# Patient Record
Sex: FEMALE | Race: WHITE | Employment: OTHER | URBAN - METROPOLITAN AREA
[De-identification: names, ages, dates, MRNs, and addresses within clinical notes are randomized per-mention and may not be internally consistent; named-entity substitution may affect disease eponyms.]

---

## 2023-11-20 ENCOUNTER — OFFICE VISIT (OUTPATIENT)
Dept: FAMILY MEDICINE CLINIC | Facility: CLINIC | Age: 63
End: 2023-11-20
Payer: MEDICARE

## 2023-11-20 VITALS
TEMPERATURE: 98 F | WEIGHT: 194 LBS | HEART RATE: 74 BPM | SYSTOLIC BLOOD PRESSURE: 150 MMHG | RESPIRATION RATE: 16 BRPM | BODY MASS INDEX: 33.12 KG/M2 | OXYGEN SATURATION: 94 % | DIASTOLIC BLOOD PRESSURE: 88 MMHG | HEIGHT: 64 IN

## 2023-11-20 DIAGNOSIS — Z76.89 ENCOUNTER TO ESTABLISH CARE: Primary | ICD-10-CM

## 2023-11-20 DIAGNOSIS — K21.00 GASTROESOPHAGEAL REFLUX DISEASE WITH ESOPHAGITIS WITHOUT HEMORRHAGE: ICD-10-CM

## 2023-11-20 DIAGNOSIS — E04.1 THYROID NODULE: ICD-10-CM

## 2023-11-20 DIAGNOSIS — R03.0 ELEVATED BP WITHOUT DIAGNOSIS OF HYPERTENSION: ICD-10-CM

## 2023-11-20 DIAGNOSIS — E03.9 HYPOTHYROIDISM, UNSPECIFIED TYPE: ICD-10-CM

## 2023-11-20 RX ORDER — LEVOTHYROXINE SODIUM 0.15 MG/1
150 TABLET ORAL
Qty: 90 TABLET | Refills: 1 | Status: SHIPPED | OUTPATIENT
Start: 2023-11-20

## 2023-11-20 RX ORDER — ESOMEPRAZOLE MAGNESIUM 40 MG/1
40 CAPSULE, DELAYED RELEASE ORAL
Qty: 90 CAPSULE | Refills: 0 | Status: SHIPPED | OUTPATIENT
Start: 2023-11-20

## 2023-11-20 RX ORDER — LEVOTHYROXINE SODIUM 0.15 MG/1
150 TABLET ORAL
COMMUNITY
End: 2023-11-20

## 2023-12-04 ENCOUNTER — OFFICE VISIT (OUTPATIENT)
Dept: FAMILY MEDICINE CLINIC | Facility: CLINIC | Age: 63
End: 2023-12-04
Payer: MEDICARE

## 2023-12-04 VITALS
HEIGHT: 64 IN | DIASTOLIC BLOOD PRESSURE: 82 MMHG | OXYGEN SATURATION: 96 % | WEIGHT: 195 LBS | TEMPERATURE: 98 F | BODY MASS INDEX: 33.29 KG/M2 | RESPIRATION RATE: 16 BRPM | SYSTOLIC BLOOD PRESSURE: 170 MMHG | HEART RATE: 78 BPM

## 2023-12-04 DIAGNOSIS — I10 PRIMARY HYPERTENSION: Primary | ICD-10-CM

## 2023-12-04 PROCEDURE — 99213 OFFICE O/P EST LOW 20 MIN: CPT | Performed by: NURSE PRACTITIONER

## 2023-12-04 RX ORDER — HYDROCHLOROTHIAZIDE 12.5 MG/1
12.5 TABLET ORAL DAILY
Qty: 90 TABLET | Refills: 1 | Status: SHIPPED | OUTPATIENT
Start: 2023-12-04 | End: 2024-11-28

## 2023-12-08 ENCOUNTER — TELEPHONE (OUTPATIENT)
Dept: FAMILY MEDICINE CLINIC | Facility: CLINIC | Age: 63
End: 2023-12-08

## 2023-12-08 DIAGNOSIS — J01.90 SUBACUTE SINUSITIS, UNSPECIFIED LOCATION: Primary | ICD-10-CM

## 2023-12-08 RX ORDER — AMOXICILLIN AND CLAVULANATE POTASSIUM 875; 125 MG/1; MG/1
1 TABLET, FILM COATED ORAL 2 TIMES DAILY
Qty: 20 TABLET | Refills: 0 | Status: SHIPPED | OUTPATIENT
Start: 2023-12-08 | End: 2023-12-18

## 2023-12-08 NOTE — TELEPHONE ENCOUNTER
Called the pt and advised of the medication and follow up recommendations    She v/u    Offered to schedule appt for next week and the pt states that she will schedule on line

## 2023-12-08 NOTE — TELEPHONE ENCOUNTER
Spoke with patient who states she is not having wheezing or shortness of breath. States she feels a little winded if up moving around. States she can feel mucus/noises in her throat. States once she coughs up mucus throat clears up. States she has sjogrens disease so her sinus drainage is think and sinuses feel very dry.     Routed to [facility] to advise if rx to be sent

## 2023-12-08 NOTE — TELEPHONE ENCOUNTER
Patient was seen earlier in the week    She was starting to get \"cold\" symptoms    She was told to call if symptoms worsened    Patient states sinus pressure and pain are very bad now    Feeling much worse    Requests rx    Please adv  Thank you

## 2023-12-10 ENCOUNTER — HOSPITAL ENCOUNTER (OUTPATIENT)
Dept: CT IMAGING | Age: 63
Discharge: HOME | End: 2023-12-10
Attending: NURSE PRACTITIONER

## 2023-12-10 DIAGNOSIS — Z13.6 SCREENING FOR HEART DISEASE: ICD-10-CM

## 2023-12-12 ENCOUNTER — TELEPHONE (OUTPATIENT)
Dept: FAMILY MEDICINE CLINIC | Facility: CLINIC | Age: 63
End: 2023-12-12

## 2023-12-12 PROBLEM — I10 PRIMARY HYPERTENSION: Status: ACTIVE | Noted: 2023-12-12

## 2023-12-12 NOTE — TELEPHONE ENCOUNTER
----- Message from ADELE Cody sent at 12/12/2023 11:13 AM CST -----  Calcium score is zero, which is a healthy finding. It might not be a bad idea to start a daily fish oil supplement to preserve heart health. As far as the lung findings, suggests chronic bronchitis, which may be related to sjogrens. Speaking of, I would like her to stop hydrochlorothiazide and start lisinopril 5mg instead. If blood pressures remain above 140/90 increase dose to 10mg daily.

## 2023-12-12 NOTE — TELEPHONE ENCOUNTER
Patient notified and verbalized understanding. Has tried fish oil in the past but it causes stomach pain. Is agreeable to stopping hydrochlorothiazide and trying lisinopril  Aware script was sent to [address] [place] [address]. States readings typically 135/80-83  Patient advised to record readings for the next week or so and call to update [facility]. Patient state she has f/u scheduled.  Will bring readings to appt  Future Appointments   Date Time Provider Tito Chung   12/13/2023  1:30 PM PF [facility]3 PF [facility]   12/18/2023 10:00 AM Sheila Petyt [facility] Nabeel Al   [facility]24 [facility]

## 2023-12-13 ENCOUNTER — HOSPITAL ENCOUNTER (OUTPATIENT)
Dept: ULTRASOUND IMAGING | Age: 63
Discharge: HOME | End: 2023-12-13
Attending: NURSE PRACTITIONER
Payer: MEDICARE

## 2023-12-13 DIAGNOSIS — E04.1 THYROID NODULE: ICD-10-CM

## 2023-12-13 DIAGNOSIS — E03.9 HYPOTHYROIDISM, UNSPECIFIED TYPE: ICD-10-CM

## 2023-12-13 PROCEDURE — 76536 US EXAM OF HEAD AND NECK: CPT | Performed by: NURSE PRACTITIONER

## 2023-12-14 ENCOUNTER — TELEPHONE (OUTPATIENT)
Dept: FAMILY MEDICINE CLINIC | Facility: CLINIC | Age: 63
End: 2023-12-14

## 2023-12-14 NOTE — TELEPHONE ENCOUNTER
----- Message from ADELE Copeland sent at 12/14/2023  9:47 AM CST -----  Stable findings, no nodules - continue levothyroxine as prescribed

## 2023-12-18 ENCOUNTER — TELEMEDICINE (OUTPATIENT)
Dept: FAMILY MEDICINE CLINIC | Facility: CLINIC | Age: 63
End: 2023-12-18
Payer: MEDICARE

## 2023-12-18 DIAGNOSIS — I10 PRIMARY HYPERTENSION: Primary | ICD-10-CM

## 2023-12-18 PROCEDURE — 99212 OFFICE O/P EST SF 10 MIN: CPT | Performed by: NURSE PRACTITIONER

## 2023-12-18 NOTE — PROGRESS NOTES
Unsuccessful video visit 09:56 and 09:58am  Sent direct link via text without response 09:58  Video connected without audio, completed visit via phone call    CHIEF COMPLAINT:  Sinus infection and blood pressure    HISTORY OF PRESENT ILLNESS:    This is a 70year old female who verbally consents to a phone visit today, 12/18/23 for follow-up on sinus infection and blood pressure. Stacy Villarreal believes she is recovering. Took last dose of amox/clav this morning. Reports improvement over the past two days. Has not used flonase in the past two days. Reports some vaginal irritation and possible yeast, declines rx at this time. Denies fevers or body aches. Blood pressures at home have been 150/88, 142/81, and 151/84 with use of lisinopril 5mg. We will increase to 10mg daily. Stacy agrees to continue to monitor blood pressures at home and to send [employer] message with blood pressure readings in about a week. Positive for dry mouth, which is not a new finding for her. ALLERGIES:  Allergies   Allergen Reactions    Ciprofloxacin MYALGIA     Rhabdomyolitis    Sulfa Antibiotics RASH     Red rash all over body    Diflucan [Fluconazole] RASH       CURRENT MEDICATIONS:  Current Outpatient Medications   Medication Sig Dispense Refill    lisinopril 5 MG Oral Tab Take 1 tablet (5 mg total) by mouth daily. 90 tablet 0    amoxicillin clavulanate 875-125 MG Oral Tab Take 1 tablet by mouth 2 (two) times daily for 10 days. 20 tablet 0    Esomeprazole Magnesium 40 MG Oral Capsule Delayed Release Take 1 capsule (40 mg total) by mouth every morning before breakfast. 90 capsule 0    levothyroxine 150 MCG Oral Tab Take 1 tablet (150 mcg total) by mouth before breakfast. 90 tablet 1    hydroxychloroquine 200 MG Oral Tab Take 1 tablet (200 mg total) by mouth 2 (two) times daily. 180 tablet 1    cyclobenzaprine 5 MG Oral Tab Take 1 tablet (5 mg total) by mouth 3 (three) times daily as needed for Muscle spasms.  90 tablet 0 Vitamin D, Cholecalciferol, 25 MCG (1000 UT) Oral Tab Take by mouth. MEDICAL HISTORY:  Past Medical History:   Diagnosis Date    GERD with esophagitis     Hx of colonic polyps     Tubular adenoma    Hypothyroidism     Lupus ([address]     Sjogren's disease ([facility] [address]      Past Surgical History:   Procedure Laterality Date    CARPAL TUNNEL RELEASE Right 2018    CARPAL TUNNEL RELEASE Left     COLONOSCOPY  2017    No polyps    CORRECT BUNION,SIMPLE Left     CORRECT CLAW FINGER      EGD  2017    Normal     Family History   Problem Relation Age of Onset    Other (lymphoma) Mother     Hypertension Mother     Cancer Father         kidney    Cancer Sister         leukemia    Other (Other) Sister         Lupus    Cancer Sister     Other (ALS) Sister     Cancer Brother         leukemia    Other (Chronic lymphocytic leukemia) Brother     Cancer Brother         Chronic lymphocytic leukemia    Other (Other) Brother      Family Status   Relation Status    Mo (Not Specified)    Fa (Not Specified)    Sis     Sis Alive    Sis Alive    [address]     [address]      Social History     Socioeconomic History    Marital status:    Tobacco Use    Smoking status: Never    Smokeless tobacco: Never       ROS:    GENERAL:  Reports feeling better  RESPIRATORY:  Denies difficulty breathing  CARDIO:  Denies chest pain with exertion  NEURO:  Denies recent episodes of syncope    VITALS:  No vitals were obtained by clinical staff during this visit. PHYSICAL EXAM:    Physical exam is limited due to video without audio then phone visit. Jorgito Mccloud serves as a reliable historian. Speech is clear and organized without difficulty speaking in full sentences. No shortness of breath or cough noted during our conversation. Overall is feeling better than 10 days ago. ASSESSMENT & PLAN:    1.  Primary hypertension  Discontinued hydrochlorothiazide   + sjogren's syndrome  Began lisinopril 5mg daily  Increased to lisinopril 10mg daily

## 2023-12-26 ENCOUNTER — PATIENT MESSAGE (OUTPATIENT)
Dept: FAMILY MEDICINE CLINIC | Facility: CLINIC | Age: 63
End: 2023-12-26

## 2023-12-26 NOTE — TELEPHONE ENCOUNTER
From: [facility]  To: Juancho Mitchell  Sent: 12/26/2023 12:07 PM CST  Subject: Blood pressure readings    I started taking HCTZ on 12/8 but took only for one one week. Readings were in 130-156 / 83-91. Started 5 mg Lisinopril on 12/14. Readings were 138/82, 137/86, 150/88, 142/81, 154/84, 152/89    Started 10 mg Lisinopril on 12/18. Readings were 145/81, 143/88, 148/86, 147/83, 154/85, 133/82, 148/78, 149/83, 132/76, 149/80, 155/85, 143/85, 157/92 (today)  I check the readings about 7:30 am before coffee or breakfast, and again around 5 to 7 pm. The mornings seem just as high as the late afternoon. Feeling fine otherwise. Of course, Blas parties. But coffee intake down and no weight gain. Not great results.

## 2023-12-28 NOTE — TELEPHONE ENCOUNTER
Pt asking for a follow up to her bp readings--not sure low dose bp med is helping    Pt out of medication now and would like NP to send new script to [facility] Hinkle in [facility]. Please advise, thank you!

## 2023-12-29 DIAGNOSIS — I10 PRIMARY HYPERTENSION: Primary | ICD-10-CM

## 2023-12-29 RX ORDER — LISINOPRIL 10 MG/1
10 TABLET ORAL 2 TIMES DAILY
Qty: 60 TABLET | Refills: 0 | Status: SHIPPED | OUTPATIENT
Start: 2023-12-29 | End: 2023-12-30

## 2023-12-29 NOTE — TELEPHONE ENCOUNTER
Pt failed refill protocol for the following reasons:  Hypertension Medications Protocol Cemuzt2812/29/2023 01:30 PM   Protocol Details CMP or BMP in past 12 months    Last serum creatinine< 2.0    Appointment in past 6 or next 3 months         Last refill: 12/29/23  Last appt: 12/4/23  Next appt: Future Appointments   Date Time Provider Tito Chung   3/4/2024 10:00 AM ADELE Escobedo [facility] TASHA Luque [place] to Nurse Practitioner Carlos Daigle, please advise on refills. Thank you.

## 2023-12-29 NOTE — TELEPHONE ENCOUNTER
Per message she increased to Lisinopril 10 mg on 12/18/2023.  Should she continue to take or increase to 20 mg?

## 2023-12-29 NOTE — TELEPHONE ENCOUNTER
May take about 2 weeks to see full effect. We can increase to 10mg if she would like then we can always cut back if needed.     I will send 10mg of lisinopril if she hasn't already picked up the 90 day supply of 5mg that I sent two weeks ago    Please confirm if patient is comfortable with increasing to 10mg   Please confirm if she picked up the 90 day supply of 5mg that I sent two weeks ago (if she hasn't I will send #90 of 10mg instead)

## 2023-12-29 NOTE — TELEPHONE ENCOUNTER
If blood pressures are greater than 140/90 after January 1st, 2024, begin taking 20mg daily    Would she like me to send 30 day supply of 10mg then once we get a stable dose we will start sending 90 day supply?

## 2023-12-29 NOTE — TELEPHONE ENCOUNTER
Spoke to patient, she is out of 5mg tabs as she only picked up a 30 day supply. Offered to do BID dosing of Lisinopril 10mg so if after the 1st of January, BP readings are still high, she will have some to increase to 20mg.

## 2023-12-30 RX ORDER — LISINOPRIL 10 MG/1
20 TABLET ORAL DAILY
Qty: 180 TABLET | Refills: 0 | Status: SHIPPED | OUTPATIENT
Start: 2023-12-30

## 2024-01-08 ENCOUNTER — PATIENT MESSAGE (OUTPATIENT)
Dept: FAMILY MEDICINE CLINIC | Facility: CLINIC | Age: 64
End: 2024-01-08

## 2024-01-08 NOTE — TELEPHONE ENCOUNTER
From: Tiffany Hensley  To: Germaine Shabazz  Sent: 1/8/2024 1:13 PM CST  Subject: New b/p readings on 20 mg lisinopril    On 1/1 I started taking 20 mg lisinopril each night. The readings are not moving consistently down. And the morning readings seem higher than evening.  AM READINGS PM READINGS  149/86 Bryan.2 140/80  151/85 Bryan. 4  138/89  158/88 Bryan. 5 156/82  154/94 Bryan. 6 didn't check  137/87 Bryan. 7 144/84  148/86 Bryan. 8 143/78 (1:00 pm)  What's next?

## 2024-01-22 ENCOUNTER — OFFICE VISIT (OUTPATIENT)
Dept: FAMILY MEDICINE CLINIC | Facility: CLINIC | Age: 64
End: 2024-01-22
Payer: MEDICARE

## 2024-01-22 VITALS
SYSTOLIC BLOOD PRESSURE: 134 MMHG | OXYGEN SATURATION: 99 % | HEIGHT: 64 IN | BODY MASS INDEX: 32.98 KG/M2 | TEMPERATURE: 97 F | DIASTOLIC BLOOD PRESSURE: 74 MMHG | WEIGHT: 193.19 LBS | HEART RATE: 68 BPM

## 2024-01-22 DIAGNOSIS — I10 PRIMARY HYPERTENSION: ICD-10-CM

## 2024-01-22 PROCEDURE — 99213 OFFICE O/P EST LOW 20 MIN: CPT | Performed by: NURSE PRACTITIONER

## 2024-01-22 RX ORDER — LOSARTAN POTASSIUM 25 MG/1
25 TABLET ORAL DAILY
Qty: 90 TABLET | Refills: 0 | Status: SHIPPED | OUTPATIENT
Start: 2024-01-22

## 2024-01-22 RX ORDER — DOXYCYCLINE HYCLATE 100 MG/1
100 CAPSULE ORAL 2 TIMES DAILY
COMMUNITY
Start: 2024-01-15

## 2024-01-22 RX ORDER — SODIUM PICOSULFATE, MAGNESIUM OXIDE, AND ANHYDROUS CITRIC ACID 12; 3.5; 1 G/175ML; G/175ML; MG/175ML
LIQUID ORAL
COMMUNITY
Start: 2024-01-18

## 2024-02-13 ENCOUNTER — OFFICE VISIT (OUTPATIENT)
Dept: FAMILY MEDICINE CLINIC | Facility: CLINIC | Age: 64
End: 2024-02-13
Payer: MEDICARE

## 2024-02-13 VITALS
SYSTOLIC BLOOD PRESSURE: 162 MMHG | BODY MASS INDEX: 33.12 KG/M2 | RESPIRATION RATE: 16 BRPM | TEMPERATURE: 98 F | OXYGEN SATURATION: 99 % | WEIGHT: 194 LBS | HEART RATE: 68 BPM | DIASTOLIC BLOOD PRESSURE: 82 MMHG | HEIGHT: 64 IN

## 2024-02-13 DIAGNOSIS — I10 UNCONTROLLED HYPERTENSION: Primary | ICD-10-CM

## 2024-02-13 PROCEDURE — 99213 OFFICE O/P EST LOW 20 MIN: CPT | Performed by: NURSE PRACTITIONER

## 2024-02-13 NOTE — PROGRESS NOTES
CHIEF COMPLAINT:    Chief Complaint   Patient presents with    Follow - Up     Blood pressure medication follow up       HISTORY OF PRESENT ILLNESS:    Tiffany presents today, February 13, 2024, for blood pressure check and to compare blood pressure cuff readings.  Blood pressures with use of losartan 25mg are higher than readings with previous use of lisinopril 30mg.  Uncertain of daily sodium intake, discussed low sodium diet, less than 1500mg daily.    We will increase losartan from 25mg to 50mg and continue blood pressure checks.  Plan to reassess after about 2-4 weeks.  Tiffany to call clinic if blood pressure increases with increase of losartan.  Considering amlodipine.    Denies headaches, near fainting, chest pain, swelling of ankles, or diarrhea.  Denies new symptoms since last visit.    BP Readings from Last 5 Encounters:   02/13/24 (!) 153/91   01/22/24 134/74   12/04/23 (!) 170/82   11/20/23 150/88   01/18/22 120/77     ALLERGIES:  Allergies   Allergen Reactions    Ciprofloxacin MYALGIA     Rhabdomyolitis    Sulfa Antibiotics RASH     Red rash all over body    Diflucan [Fluconazole] RASH       CURRENT MEDICATIONS:  Current Outpatient Medications   Medication Sig Dispense Refill    losartan 25 MG Oral Tab Take 1 tablet (25 mg total) by mouth daily. 90 tablet 0    Esomeprazole Magnesium 40 MG Oral Capsule Delayed Release Take 1 capsule (40 mg total) by mouth every morning before breakfast. 90 capsule 0    levothyroxine 150 MCG Oral Tab Take 1 tablet (150 mcg total) by mouth before breakfast. 90 tablet 1    hydroxychloroquine 200 MG Oral Tab Take 1 tablet (200 mg total) by mouth 2 (two) times daily. 180 tablet 1    cyclobenzaprine 5 MG Oral Tab Take 1 tablet (5 mg total) by mouth 3 (three) times daily as needed for Muscle spasms. 90 tablet 0    Vitamin D, Cholecalciferol, 25 MCG (1000 UT) Oral Tab Take by mouth.         MEDICAL HISTORY:  Past Medical History:   Diagnosis Date    GERD with esophagitis      Hx of colonic polyps     Tubular adenoma    Hypothyroidism     Lupus (HCC)     Sjogren's disease (HCC)      Past Surgical History:   Procedure Laterality Date    CARPAL TUNNEL RELEASE Right 2018    CARPAL TUNNEL RELEASE Left     COLONOSCOPY  2017    No polyps    CORRECT BUNION,SIMPLE Left     CORRECT CLAW FINGER      EGD  2017    Normal     Family History   Problem Relation Age of Onset    Other (lymphoma) Mother     Hypertension Mother     Cancer Father         kidney    Cancer Sister         leukemia    Other (Other) Sister         Lupus    Cancer Sister     Other (ALS) Sister     Cancer Brother         leukemia    Other (Chronic lymphocytic leukemia) Brother     Cancer Brother         Chronic lymphocytic leukemia    Other (Other) Brother      Family Status   Relation Status    Mo (Not Specified)    Fa (Not Specified)    Sis     Sis Alive    Sis Alive    Bro     Bro      Social History     Socioeconomic History    Marital status:    Tobacco Use    Smoking status: Never    Smokeless tobacco: Never   Vaping Use    Vaping Use: Never used   Substance and Sexual Activity    Alcohol use: Not Currently    Drug use: Never       ROS:  GENERAL:  Denies recorded temperatures greater than 100.5F  RESPIRATORY:  Denies difficulty breathing  CARDIAC:  Denies swelling of ankles or chest pain    VITALS:   BP (!) 153/91   Pulse 68   Temp 98 °F (36.7 °C) (Temporal)   Resp 16   Ht 5' 4\" (1.626 m)   Wt 194 lb (88 kg)   SpO2 99%   BMI 33.30 kg/m²    Reviewed by Germaine Shabazz MS, APRN, FNP-BC    PHYSICAL EXAM:    Constitutional:       Appears well.  Sitting upright on exam table.  Well developed, well nourished, and in no acute distress  HEENT:      Facial features symmetric. Normocephalic and atraumatic     Sclera anicteric.  EOMs intact without nystagmus.  Pupils round and equal.  Cardiovascular:      Heart sounds: Regular rate and rhythm without murmur  Pulmonary:      Chest  expansion symmetric.  Breathing nonlabored. Lungs clear throughout  Musculoskeletal:         Movements smooth and controlled with appropriate coordination.       Gait intact, steady, nonantalgic.  Skin:     Warm and dry without discoloration.  Psychiatric:         Alert and oriented.  Calm and cooperative.  Speech is clear.     ASSESSMENT & PLAN:    Hesitancy with increasing lisinopril dose, replaced with losartan 25mg,  Increased losartan to 50mg today  Recheck in 2-4 weeks    If no improvement, will switch to amlodipine 5mg and/or follow-up with cardiology    1. Uncontrolled hypertension  Reinforced low sodium diet  - Cardio Referral - Internal

## 2024-02-19 ENCOUNTER — OFFICE VISIT (OUTPATIENT)
Dept: FAMILY MEDICINE CLINIC | Facility: CLINIC | Age: 64
End: 2024-02-19
Payer: MEDICARE

## 2024-02-19 VITALS
TEMPERATURE: 98 F | SYSTOLIC BLOOD PRESSURE: 128 MMHG | DIASTOLIC BLOOD PRESSURE: 72 MMHG | RESPIRATION RATE: 16 BRPM | HEART RATE: 82 BPM | HEIGHT: 64 IN | WEIGHT: 194 LBS | OXYGEN SATURATION: 95 % | BODY MASS INDEX: 33.12 KG/M2

## 2024-02-19 DIAGNOSIS — R82.998 URINE WBC INCREASED: ICD-10-CM

## 2024-02-19 DIAGNOSIS — R30.0 DYSURIA: Primary | ICD-10-CM

## 2024-02-19 LAB
APPEARANCE: CLEAR
BILIRUBIN: NEGATIVE
GLUCOSE (URINE DIPSTICK): NEGATIVE MG/DL
KETONES (URINE DIPSTICK): NEGATIVE MG/DL
MULTISTIX LOT#: ABNORMAL NUMERIC
NITRITE, URINE: NEGATIVE
PH, URINE: 5.5 (ref 4.5–8)
PROTEIN (URINE DIPSTICK): NEGATIVE MG/DL
SPECIFIC GRAVITY: 1.01 (ref 1–1.03)
URINE-COLOR: YELLOW
UROBILINOGEN,SEMI-QN: 0.2 MG/DL (ref 0–1.9)

## 2024-02-19 PROCEDURE — 87086 URINE CULTURE/COLONY COUNT: CPT | Performed by: NURSE PRACTITIONER

## 2024-02-19 RX ORDER — NITROFURANTOIN 25; 75 MG/1; MG/1
100 CAPSULE ORAL 2 TIMES DAILY
Qty: 10 CAPSULE | Refills: 0 | Status: SHIPPED | OUTPATIENT
Start: 2024-02-19 | End: 2024-02-24

## 2024-02-19 RX ORDER — PANCRELIPASE 36000; 180000; 114000 [USP'U]/1; [USP'U]/1; [USP'U]/1
CAPSULE, DELAYED RELEASE PELLETS ORAL
COMMUNITY
Start: 2024-02-14

## 2024-02-19 NOTE — PROGRESS NOTES
CHIEF COMPLAINT:    Chief Complaint   Patient presents with    UTI     Burning when urinating       HISTORY OF PRESENT ILLNESS:    Tiffany presents today, February 19, 2024, for burning with urination.  Began yesterday afternoon.  Has been drinking a lot of water, which provides some relief.  Lower abdominal pain, aching.  Hx of UTIs in the past, reports symptoms feel similar.  Denies fever, blood in urine or flank pain.    Blood pressure has also improved compared to last visit.  Reports home readings between 130-140s/70s-90s.     ALLERGIES:  Allergies   Allergen Reactions    Ciprofloxacin MYALGIA     Rhabdomyolitis    Sulfa Antibiotics RASH     Red rash all over body    Diflucan [Fluconazole] RASH       CURRENT MEDICATIONS:  Current Outpatient Medications   Medication Sig Dispense Refill    CREON 41615-230753 units Oral Cap DR Particles Not taking yet: 2/19/2024      losartan 25 MG Oral Tab Take 1 tablet (25 mg total) by mouth daily. 90 tablet 0    Esomeprazole Magnesium 40 MG Oral Capsule Delayed Release Take 1 capsule (40 mg total) by mouth every morning before breakfast. 90 capsule 0    levothyroxine 150 MCG Oral Tab Take 1 tablet (150 mcg total) by mouth before breakfast. 90 tablet 1    hydroxychloroquine 200 MG Oral Tab Take 1 tablet (200 mg total) by mouth 2 (two) times daily. 180 tablet 1    cyclobenzaprine 5 MG Oral Tab Take 1 tablet (5 mg total) by mouth 3 (three) times daily as needed for Muscle spasms. 90 tablet 0    Vitamin D, Cholecalciferol, 25 MCG (1000 UT) Oral Tab Take by mouth.         MEDICAL HISTORY:  Past Medical History:   Diagnosis Date    GERD with esophagitis     Hx of colonic polyps     Tubular adenoma    Hypothyroidism     Lupus (HCC)     Sjogren's disease (HCC)      Past Surgical History:   Procedure Laterality Date    CARPAL TUNNEL RELEASE Right 12/2018    CARPAL TUNNEL RELEASE Left 2014    COLONOSCOPY  04/2017    No polyps    CORRECT BUNION,SIMPLE Left 1990    CORRECT CLAW FINGER       EGD  2017    Normal     Family History   Problem Relation Age of Onset    Other (lymphoma) Mother     Hypertension Mother     Cancer Father         kidney    Cancer Sister         leukemia    Other (Other) Sister         Lupus    Cancer Sister     Other (ALS) Sister     Cancer Brother         leukemia    Other (Chronic lymphocytic leukemia) Brother     Cancer Brother         Chronic lymphocytic leukemia    Other (Other) Brother      Family Status   Relation Status    Mo (Not Specified)    Fa (Not Specified)    Sis     Sis Alive    Sis Alive    Bro     Bro      Social History     Socioeconomic History    Marital status:    Tobacco Use    Smoking status: Never    Smokeless tobacco: Never   Vaping Use    Vaping Use: Never used   Substance and Sexual Activity    Alcohol use: Not Currently    Drug use: Never       ROS:  As stated in HPI    VITALS:   /66   Pulse 82   Temp 97.8 °F (36.6 °C) (Temporal)   Resp 16   Ht 5' 4\" (1.626 m)   Wt 194 lb (88 kg)   SpO2 95%   BMI 33.30 kg/m²     Reviewed by Germaine Shabazz MS, APRN, FNP-BC    PHYSICAL EXAM:    Constitutional:       Appears well.  Sitting upright on exam table.  Well developed, well nourished, and in no acute distress  HEENT:      Facial features symmetric. Normocephalic and atraumatic     Sclera anicteric.  EOMs intact without nystagmus.  Pupils round and equal.  Cardiovascular:      Heart sounds: Regular rate and rhythm Musculoskeletal:         Movements smooth and controlled with appropriate coordination.       Gait intact, steady, nonantalgic.  Skin:     Warm and dry without discoloration.  Psychiatric:         Alert and oriented.  Calm and cooperative.  Speech is clear.     ASSESSMENT & PLAN:    1. Dysuria  - Urine Dip, auto without Micro  - Urine Culture, Routine [E]; Future  - Urine Culture, Routine [E]  - nitrofurantoin monohydrate macro 100 MG Oral Cap; Take 1 capsule (100 mg total) by mouth 2 (two) times  daily for 5 days.  Dispense: 10 capsule; Refill: 0    2. Urine WBC increased  - nitrofurantoin monohydrate macro 100 MG Oral Cap; Take 1 capsule (100 mg total) by mouth 2 (two) times daily for 5 days.  Dispense: 10 capsule; Refill: 0    We will provide abx for if symptoms worsen.  Awaiting culture results.

## 2024-02-22 ENCOUNTER — TELEPHONE (OUTPATIENT)
Dept: FAMILY MEDICINE CLINIC | Facility: CLINIC | Age: 64
End: 2024-02-22

## 2024-02-22 NOTE — TELEPHONE ENCOUNTER
----- Message from ADELE Pace sent at 2/21/2024  9:08 PM CST -----  Contaminated specimen, inconclusive results.  Continue abx, if symptoms fail to improve please call clinic for retesting.

## 2024-03-25 ENCOUNTER — HOSPITAL ENCOUNTER (OUTPATIENT)
Dept: CV DIAGNOSTICS | Age: 64
Discharge: HOME | End: 2024-03-25
Attending: INTERNAL MEDICINE
Payer: MEDICARE

## 2024-03-25 ENCOUNTER — HOSPITAL ENCOUNTER (OUTPATIENT)
Dept: ULTRASOUND IMAGING | Age: 64
Discharge: HOME | End: 2024-03-25
Attending: INTERNAL MEDICINE
Payer: MEDICARE

## 2024-03-25 DIAGNOSIS — I10 HTN (HYPERTENSION): ICD-10-CM

## 2024-03-25 DIAGNOSIS — I70.0 ARTERIOSCLEROSIS OF ABDOMINAL AORTA (HCC): ICD-10-CM

## 2024-03-25 PROCEDURE — 93880 EXTRACRANIAL BILAT STUDY: CPT | Performed by: INTERNAL MEDICINE

## 2024-03-25 PROCEDURE — 93306 TTE W/DOPPLER COMPLETE: CPT | Performed by: INTERNAL MEDICINE

## 2024-04-02 ENCOUNTER — OFFICE VISIT (OUTPATIENT)
Dept: FAMILY MEDICINE CLINIC | Facility: CLINIC | Age: 64
End: 2024-04-02
Payer: MEDICARE

## 2024-04-02 VITALS
OXYGEN SATURATION: 96 % | SYSTOLIC BLOOD PRESSURE: 118 MMHG | BODY MASS INDEX: 33.63 KG/M2 | RESPIRATION RATE: 16 BRPM | DIASTOLIC BLOOD PRESSURE: 62 MMHG | WEIGHT: 197 LBS | TEMPERATURE: 98 F | HEIGHT: 64 IN | HEART RATE: 74 BPM

## 2024-04-02 DIAGNOSIS — Z86.010 HX OF COLONIC POLYPS: ICD-10-CM

## 2024-04-02 DIAGNOSIS — Z00.00 ENCOUNTER FOR ANNUAL HEALTH EXAMINATION: Primary | ICD-10-CM

## 2024-04-02 DIAGNOSIS — K21.00 GASTROESOPHAGEAL REFLUX DISEASE WITH ESOPHAGITIS WITHOUT HEMORRHAGE: ICD-10-CM

## 2024-04-02 DIAGNOSIS — E03.9 HYPOTHYROIDISM, UNSPECIFIED TYPE: ICD-10-CM

## 2024-04-02 DIAGNOSIS — E78.5 HYPERLIPIDEMIA, UNSPECIFIED HYPERLIPIDEMIA TYPE: ICD-10-CM

## 2024-04-02 DIAGNOSIS — Z12.31 ENCOUNTER FOR SCREENING MAMMOGRAM FOR MALIGNANT NEOPLASM OF BREAST: ICD-10-CM

## 2024-04-02 DIAGNOSIS — Z13.820 SCREENING FOR OSTEOPOROSIS: ICD-10-CM

## 2024-04-02 DIAGNOSIS — I10 PRIMARY HYPERTENSION: ICD-10-CM

## 2024-04-02 DIAGNOSIS — M35.00 SJOGREN'S SYNDROME, WITH UNSPECIFIED ORGAN INVOLVEMENT (HCC): Chronic | ICD-10-CM

## 2024-04-02 DIAGNOSIS — Z78.0 POSTMENOPAUSAL: ICD-10-CM

## 2024-04-02 PROCEDURE — G0402 INITIAL PREVENTIVE EXAM: HCPCS | Performed by: NURSE PRACTITIONER

## 2024-04-02 RX ORDER — AMLODIPINE AND OLMESARTAN MEDOXOMIL 10; 40 MG/1; MG/1
1 TABLET ORAL DAILY
COMMUNITY
Start: 2024-03-13

## 2024-04-02 NOTE — PROGRESS NOTES
Subjective:   Tiffany Hensley is a 63 year old female who presents for a Medicare Initial Preventative Physical Exam (Welcome to Medicare- < 12 months on Medicare) and scheduled follow up of multiple significant but stable problems.     History/Other:   Fall Risk Assessment:   She has been screened for Falls and is low risk.      Cognitive Assessment:   She had a completely normal cognitive assessment - see flowsheet entries     Functional Ability/Status:   Tiffany Hensley has a completely normal functional assessment. See flowsheet for details.    Depression Screening (PHQ-2/PHQ-9): PHQ-2 SCORE: 0  , done 4/1/2024   If you checked off any problems, how difficult have these problems made it for you to do your work, take care of things at home, or get along with other people?: Not difficult at all       <5 minutes spent screening and counseling for depression    Advanced Directives:   She does have a Living Will but we do NOT have it on file in Epic.    She does have a POA but we do NOT have it on file in Epic.    Discussed Advance Care Planning with patient (and family/surrogate if present). Standard forms made available to patient in After Visit Summary.    Healthcare proxy:  Undecided  Comfort care measures:  N/A at this time  Documentation:  Tiffany states she will be meeting with a  next week to discuss POA and living will      Patient Active Problem List   Diagnosis    Hypothyroidism    Sjogren's disease (HCC)    Hx of colonic polyps    Esophageal reflux    Hypertension     Allergies:  She is allergic to ciprofloxacin, sulfa antibiotics, and diflucan [fluconazole].    Current Medications:  Outpatient Medications Marked as Taking for the 4/2/24 encounter (Office Visit) with Germaine Shabazz APRN   Medication Sig    amLODIPine-Olmesartan 10-40 MG Oral Tab Take 1 tablet by mouth daily.    Esomeprazole Magnesium 40 MG Oral Capsule Delayed Release Take 1 capsule (40 mg total) by mouth every morning before  breakfast.    levothyroxine 150 MCG Oral Tab Take 1 tablet (150 mcg total) by mouth before breakfast.    hydroxychloroquine 200 MG Oral Tab Take 1 tablet (200 mg total) by mouth 2 (two) times daily.    cyclobenzaprine 5 MG Oral Tab Take 1 tablet (5 mg total) by mouth 3 (three) times daily as needed for Muscle spasms.    Vitamin D, Cholecalciferol, 25 MCG (1000 UT) Oral Tab Take by mouth.       Medical History:  She  has a past medical history of GERD with esophagitis, colonic polyps, Hypothyroidism, Lupus (HCC), Sjogren's disease (HCC), and Uncontrolled hypertension.  Surgical History:  She  has a past surgical history that includes colonoscopy (04/2017); egd (04/2017); carpal tunnel release (Right, 12/2018); carpal tunnel release (Left, 2014); correct claw finger; and correct bunion,simple (Left, 1990).   Family History:  Her family history includes ALS in her sister; Cancer in her brother, brother, father, sister, and sister; Chronic lymphocytic leukemia in her brother; Hypertension in her mother; Other in her brother and sister; lymphoma in her mother.  Social History:  She  reports that she has never smoked. She has never used smokeless tobacco. She reports that she does not currently use alcohol. She reports that she does not use drugs.    Tobacco:  She has never smoked tobacco.    CAGE Alcohol Screen:   CAGE screening score of 0 on 4/1/2024, showing low risk of alcohol abuse.      Patient Care Team:  Mauri Hernandez DO as PCP - General (Family Medicine)  Sussy Avilez DO (RHEUMATOLOGY)  Roberto Quarles DO (GASTROENTEROLOGY)  Germaine Shabazz APRN (Nurse Practitioner Family)    Review of Systems     GENERAL:  Denies fever or chills  RESPIRATORY:  Denies difficulty breathing  CARDIAC:  Denies chest pain with exertion  GI:  Denies blood in stool  :  Denies blood in urine  NEURO:  Denies recent falls   MSKL:  Denies joint stiffness or pain  SKIN:  Denies change in texture of moles   PSYCH: Denies  thoughts of self harm or harming others     Objective:   Physical Exam  Constitutional:       Appearance: Normal appearance.  Sitting upright on exam table.  Well developed, well nourished, and in no acute distress.  HEENT:      Grossly normal hearing.       Head: Facial features symmetric. Normocephalic and atraumatic.      Right Ear: Canal clear without erythema or drainage.  TM clear and intact, neutral in position.      Left Ear: Canal clear without erythema or drainage.  TM clear and intact, neutral in position.      Nose: Nose normal. Nares patent bilaterally.     Mouth/Throat: Buccal mucosa is moist.  Uvula rises midline.  Posterior pharynx nonerythematous.      Extraocular Movements: Extraocular movements intact.      Conjunctiva/sclera: Conjunctivae normal. Sclera anicteric         Pupils: Pupils are equal, round, and reactive to light.   Neck:     Neck is supple. Trachea is midline.  No lymphadenopathy.  Cardiovascular:      Rate and Rhythm: Normal rate and regular rhythm.      Heart sounds: Normal heart sounds. No murmur heard.  Pulmonary:      Effort: Pulmonary effort is normal.      Breath sounds: Lungs clear throughout.     No cough or wheezing.  Abdominal:      General: Abdomen is nondistended, soft, nontender.  No organomegaly.    Musculoskeletal:         General: Normal range of motion. Shoulders are symmetric in height.  Strength of extremities are equal bilaterally.  Skin:     General: Skin is warm and dry.      Coloration: Skin is not jaundiced.      Findings: No bruising or rash.   Neurological:      General: No focal deficit present. Speech is clear and organized.     Mental Status: Alert and oriented to person, place, and time.      Motor: Motor function is intact. Movements are smooth and controlled without ataxia.     Coordination: Coordination is intact. Coordination normal.      Gait: Gait is intact. Gait steady and nonantalgic.      Deep Tendon Reflexes: Reflexes 2+  bilaterally.  Psychiatric:         Mood and Affect: Mood normal.         Behavior: Behavior normal.         Thought Content: Thought content normal.         Judgment: Judgment normal.       /62   Pulse 74   Temp 98.2 °F (36.8 °C) (Temporal)   Resp 16   Ht 5' 4\" (1.626 m)   Wt 197 lb (89.4 kg)   SpO2 96%   BMI 33.81 kg/m²  Estimated body mass index is 33.81 kg/m² as calculated from the following:    Height as of this encounter: 5' 4\" (1.626 m).    Weight as of this encounter: 197 lb (89.4 kg).    Medicare Hearing Assessment:   Hearing Screening    Time taken: 4/2/2024  1:11 PM  Entry User: Latisha Rosa CMA  Screening Method: Finger Rub  Finger Rub Result: Pass         Visual Acuity:   Right Eye Visual Acuity: Corrected Right Eye Chart Acuity: 20/40   Left Eye Visual Acuity: Corrected Left Eye Chart Acuity: 20/20   Both Eyes Visual Acuity: Corrected Both Eyes Chart Acuity: 20/20   Able To Tolerate Visual Acuity: Yes        Assessment & Plan:   Tiffany Hensley is a 63 year old female who presents for a Medicare Assessment.     1. Encounter for annual health examination  Stable adult  Due for fasting labs to assess cholesterol levels  Continue rx as prescribed  Continue seeing rheumatology and gastroenterology team  - [facility] MARE 2D+3D SCREENING BILAT (CPT=77067/70099); Future  - XR DEXA BONE DENSITOMETRY (CPT=77080); Future    2. Screening for osteoporosis  Denies concerns, routine screening  Postmenopausal  - XR DEXA BONE DENSITOMETRY (CPT=77080); Future    3. Encounter for screening mammogram for malignant neoplasm of breast  Denies concerns, routine screening  - [facility] MARE 2D+3D SCREENING BILAT (CPT=77067/05098); Future    4. Sjogren's syndrome, with unspecified organ involvement (HCC)  Stable  Continue follow-up with rheumatology team    5. Primary hypertension  Stable, controlled  Continue rx as prescribed    6. Hypothyroidism, unspecified type  TSH normal, stable  Continue rx as prescribed    7.  Gastroesophageal reflux disease with esophagitis without hemorrhage  Denies concerns  Controlled with use of esomeprazole    8. Hx of colonic polyps  Colonoscopy 01/31/2024, records reviewed and to be scanned in  Colonoscopy due in 5 years, 01/31/2029   Dr. Quarles    9. Hyperlipidemia, unspecified hyperlipidemia type  Continue Mediterranean diet  Complete fasting labs, awaiting results to guide treatment plan  - Lipid Panel [E]; Future    10. Postmenopausal  Continue physical activity  - XR DEXA BONE DENSITOMETRY (CPT=77080); Future      The patient indicates understanding of these issues and agrees to the plan.  Lab work ordered.  Reinforced healthy diet, lifestyle, and exercise.      No follow-ups on file.     Germaine Shabazz, APRN, 4/2/2024     Supplementary Documentation:   General Health:  In the past six months, have you lost more than 10 pounds without trying?: 2 - No  Has your appetite been poor?: No  Type of Diet: Low Salt  How does the patient maintain a good energy level?: Other  How would you describe your daily physical activity?: Light  How would you describe your current health state?: Good  How do you maintain positive mental well-being?: Social Interaction;Visiting Friends;Visiting Family  On a scale of 0 to 10, with 0 being no pain and 10 being severe pain, what is your pain level?: 3 - (Mild)  In the past six months, have you experienced urine leakage?: 1-Yes  At any time do you feel concerned for the safety/well-being of yourself and/or your children, in your home or elsewhere?: No  Have you had any immunizations at another office such as Influenza, Hepatitis B, Tetanus, or Pneumococcal?: No       Tiffany Hensley's SCREENING SCHEDULE   Tests on this list are recommended by your physician but may not be covered, or covered at this frequency, by your insurer.   Please check with your insurance carrier before scheduling to verify coverage.   PREVENTATIVE SERVICES FREQUENCY &  COVERAGE DETAILS LAST  COMPLETION DATE   Diabetes Screening    Fasting Blood Sugar /  Glucose    One screening every 12 months if never tested or if previously tested but not diagnosed with pre-diabetes   One screening every 6 months if diagnosed with pre-diabetes Lab Results   Component Value Date     01/18/2022        Cardiovascular Disease Screening    Lipid Panel  Cholesterol  Lipoprotein (HDL)  Triglycerides Covered every 5 years for all Medicare beneficiaries without apparent signs or symptoms of cardiovascular disease No results found for: \"CHOLEST\", \"HDL\", \"LDL\", \"LDLD\", \"LDLC\", \"TRIG\"      Electrocardiogram (EKG)   Covered if needed at Welcome to Medicare, and non-screening if indicated for medical reasons -      Ultrasound Screening for Abdominal Aortic Aneurysm (AAA) Covered once in a lifetime for one of the following risk factors    Men who are 65-75 years old and have ever smoked    Anyone with a family history -     Colorectal Cancer Screening  Covered for ages 50-85; only need ONE of the following:    Colonoscopy   Covered every 10 years    Covered every 2 years if patient is at high risk or previous colonoscopy was abnormal -    Health Maintenance   Topic Date Due    Colorectal Cancer Screening  Never done       Flexible Sigmoidoscopy   Covered every 4 years -    Fecal Occult Blood Test Covered annually -   Bone Density Screening    Bone density screening    Covered every 2 years after age 65 if diagnosed with risk of osteoporosis or estrogen deficiency.    Covered yearly for long-term glucocorticoid medication use (Steroids) No results found for this or any previous visit.      Health Maintenance Due   Topic Date Due    DEXA Scan  Never done      Pap and Pelvic    Pap   Covered every 2 years for women at normal risk; Annually if at high risk 06/17/2020  Health Maintenance   Topic Date Due    Pap Smear  10/03/2014       Chlamydia Annually if high risk -  No recommendations at this time   Screening Mammogram     Mammogram     Recommend annually for all female patients aged 40 and older    One baseline mammogram covered for patients aged 35-39 04/05/2023    Health Maintenance   Topic Date Due    Mammogram  Never done       Immunizations    Influenza Covered once per flu season  Please get every year -  No recommendations at this time    Pneumococcal Each vaccine ([id number] & [id number]) covered once after 65 Prevnar 13: -    Iymakomgb50: -     Pneumococcal Vaccination(1 of 1 - PCV) Never done    Hepatitis B One screening covered for patients with certain risk factors   -  No recommendations at this time    Tetanus Toxoid Not covered by Medicare Part B unless medically necessary (cut with metal); may be covered with your pharmacy prescription benefits -    Tetanus, Diptheria and Pertusis TD and TDaP Not covered by Medicare Part B -  No recommendations at this time    Zoster Not covered by Medicare Part B; may be covered with your pharmacy  prescription benefits -  No recommendations at this time     Annual Monitoring of Persistent Medications (ACE/ARB, digoxin diuretics, anticonvulsants)    Potassium Annually Lab Results   Component Value Date    K 4.99 01/18/2022         Creatinine   Annually Lab Results   Component Value Date    CREATSERUM 0.72 01/18/2022         BUN Annually Lab Results   Component Value Date    BUN 12.0 01/18/2022       Drug Serum Conc Annually No results found for: \"DIGOXIN\", \"DIG\", \"VALP\"

## 2024-04-02 NOTE — PATIENT INSTRUCTIONS
Health Maintenance Due   Topic Date Due    Colorectal Cancer Screening  Complete (awaiting results from GI specialist)      Mammogram  April 02, 2024    DEXA Scan  April 02, 2024           Tiffany Hensley's SCREENING SCHEDULE   Tests on this list are recommended by your physician but may not be covered, or covered at this frequency, by your insurer.   Please check with your insurance carrier before scheduling to verify coverage.   PREVENTATIVE SERVICES FREQUENCY &  COVERAGE DETAILS LAST COMPLETION DATE   Diabetes Screening    Fasting Blood Sugar /  Glucose    One screening every 12 months if never tested or if previously tested but not diagnosed with pre-diabetes   One screening every 6 months if diagnosed with pre-diabetes Lab Results   Component Value Date     01/18/2022        Cardiovascular Disease Screening    Lipid Panel  Cholesterol  Lipoprotein (HDL)  Triglycerides Covered every 5 years for all Medicare beneficiaries without apparent signs or symptoms of cardiovascular disease No results found for: \"CHOLEST\", \"HDL\", \"LDL\", \"LDLD\", \"LDLC\", \"TRIG\"      Electrocardiogram (EKG)   Covered if needed at Welcome to Medicare, and non-screening if indicated for medical reasons -      Ultrasound Screening for Abdominal Aortic Aneurysm (AAA) Covered once in a lifetime for one of the following risk factors    Men who are 65-75 years old and have ever smoked    Anyone with a family history -     Colorectal Cancer Screening  Covered for ages 50-85; only need ONE of the following:    Colonoscopy   Covered every 10 years    Covered every 2 years if patient is at high risk or previous colonoscopy was abnormal -    Health Maintenance   Topic Date Due    Colorectal Cancer Screening  Never done       Flexible Sigmoidoscopy   Covered every 4 years -    Fecal Occult Blood Test Covered annually -   Bone Density Screening    Bone density screening    Covered every 2 years after age 65 if diagnosed with risk of osteoporosis  or estrogen deficiency.    Covered yearly for long-term glucocorticoid medication use (Steroids) No results found for this or any previous visit.      Health Maintenance Due   Topic Date Due    DEXA Scan  Never done      Pap and Pelvic    Pap   Covered every 2 years for women at normal risk; Annually if at high risk 06/17/2020  Health Maintenance   Topic Date Due    Pap Smear  10/03/2014       Chlamydia Annually if high risk -  No recommendations at this time   Screening Mammogram    Mammogram     Recommend annually for all female patients aged 40 and older    One baseline mammogram covered for patients aged 35-39 04/05/2023    Health Maintenance   Topic Date Due    Mammogram  Never done       Immunizations    Influenza Covered once per flu season  Please get every year -  No recommendations at this time    Pneumococcal Each vaccine (Eirchhr92 & [id number]) covered once after 65 Prevnar 13: -    Nhvnrunur54: -     Pneumococcal Vaccination(1 of 1 - PCV) Never done    Hepatitis B One screening covered for patients with certain risk factors   -  No recommendations at this time    Tetanus Toxoid Not covered by Medicare Part B unless medically necessary (cut with metal); may be covered with your pharmacy prescription benefits -    Tetanus, Diptheria and Pertusis TD and TDaP Not covered by Medicare Part B -  No recommendations at this time    Zoster Not covered by Medicare Part B; may be covered with your pharmacy  prescription benefits -  No recommendations at this time     Annual Monitoring of Persistent Medications (ACE/ARB, digoxin diuretics, anticonvulsants)    Potassium Annually Lab Results   Component Value Date    K 4.99 01/18/2022         Creatinine   Annually Lab Results   Component Value Date    CREATSERUM 0.72 01/18/2022         BUN Annually Lab Results   Component Value Date    BUN 12.0 01/18/2022       Drug Serum Conc Annually No results found for: \"DIGOXIN\", \"DIG\", \"VALP\"

## 2024-04-04 ENCOUNTER — MED REC SCAN ONLY (OUTPATIENT)
Dept: FAMILY MEDICINE CLINIC | Facility: CLINIC | Age: 64
End: 2024-04-04

## 2024-04-04 DIAGNOSIS — I10 PRIMARY HYPERTENSION: ICD-10-CM

## 2024-04-05 RX ORDER — LOSARTAN POTASSIUM 25 MG/1
25 TABLET ORAL DAILY
Qty: 90 TABLET | Refills: 0 | OUTPATIENT
Start: 2024-04-05

## 2024-04-05 NOTE — TELEPHONE ENCOUNTER
Refill request received for losartan   Can we confirm that patient is not needing this medication?    I believe she is now taking a combo pill prescribed by the cardiology team?

## 2024-04-09 ENCOUNTER — PATIENT MESSAGE (OUTPATIENT)
Dept: FAMILY MEDICINE CLINIC | Facility: CLINIC | Age: 64
End: 2024-04-09

## 2024-04-09 NOTE — TELEPHONE ENCOUNTER
From: Tiffany Hensley  To: Germaine Shabazz  Sent: 4/9/2024 12:05 PM CDT  Subject: Fasting lipid panel    I am supposed to get a lipid panel done fasting, but I don't see the actual order on my after-visit summary.   I can't tell if you placed an order with an [facility] lab somewhere, or some other lab. And I'm not sure where the [place] lab facilities are other than on [address] in [place].   I usually get labs done at [facility] at [address]. The labs there are done by [facility] - phone [phone].  Tomorrow I will get the mammogram and bone density done at the [facility] on [address].

## 2024-04-10 ENCOUNTER — TELEPHONE (OUTPATIENT)
Dept: FAMILY MEDICINE CLINIC | Facility: CLINIC | Age: 64
End: 2024-04-10

## 2024-04-10 NOTE — TELEPHONE ENCOUNTER
Pt is at [facility] in [place], she needs the orders faxed over for mammo and bone density, fax# [phone]

## 2024-04-10 NOTE — TELEPHONE ENCOUNTER
Please call patient to provide reference lab information.  There is a lab order already in place.

## 2024-04-10 NOTE — TELEPHONE ENCOUNTER
Orders faxed electronically per Epic and manually   Pt report marked pain improvement, resting comfortably

## 2024-04-11 ENCOUNTER — LAB ENCOUNTER (OUTPATIENT)
Dept: LAB | Age: 64
End: 2024-04-11
Attending: NURSE PRACTITIONER
Payer: MEDICARE

## 2024-04-11 DIAGNOSIS — E78.5 HYPERLIPIDEMIA, UNSPECIFIED HYPERLIPIDEMIA TYPE: ICD-10-CM

## 2024-04-11 LAB
CHOLEST SERPL-MCNC: 218 MG/DL (ref ?–200)
FASTING PATIENT LIPID ANSWER: YES
HDLC SERPL-MCNC: 59 MG/DL (ref 40–59)
LDLC SERPL CALC-MCNC: 141 MG/DL (ref ?–100)
NONHDLC SERPL-MCNC: 159 MG/DL (ref ?–130)
TRIGL SERPL-MCNC: 101 MG/DL (ref 30–149)
VLDLC SERPL CALC-MCNC: 19 MG/DL (ref 0–30)

## 2024-04-11 PROCEDURE — 36415 COLL VENOUS BLD VENIPUNCTURE: CPT

## 2024-04-11 PROCEDURE — 80061 LIPID PANEL: CPT

## 2024-04-15 ENCOUNTER — TELEPHONE (OUTPATIENT)
Dept: FAMILY MEDICINE CLINIC | Facility: CLINIC | Age: 64
End: 2024-04-15

## 2024-04-15 DIAGNOSIS — E78.5 HYPERLIPIDEMIA, UNSPECIFIED HYPERLIPIDEMIA TYPE: Primary | ICD-10-CM

## 2024-04-15 NOTE — TELEPHONE ENCOUNTER
----- Message from ADELE Pace sent at 4/15/2024  9:39 AM CDT -----  Please call to discuss lab results:    LIPID PANEL - LDL is elevated, this is considered \"bad\" cholesterol.  Improve diet by limiting red meat.  The heart foundation suggests consuming less than 350g (12oz or about 0.75lb) of red meat per week.  Also reduce intake of fried foods, red meat, deli meat, pastries, chips, butter, and ice cream.  Increase aerobic exercise, the goal is 10,000 steps a day or 2.5hours a week.    Lets recheck fasting cholesterol in 3 months if still elevated, recommending medication for cholesterol.    If she feels there is no room for improvement in diet and lifestyle, recommending we start a medication today.

## 2024-04-18 NOTE — TELEPHONE ENCOUNTER
Advised patient of Germaine ANGULO's note below. Patient verbalized understanding and stated will make diet/lifestyle changes. Will recheck lab in 3 months. No further questions at this time.    Future lab order placed    Recall placed - repeat lipid in 3 months

## 2024-05-14 ENCOUNTER — DOCUMENTATION ONLY (OUTPATIENT)
Dept: FAMILY MEDICINE CLINIC | Facility: CLINIC | Age: 64
End: 2024-05-14

## 2024-05-14 DIAGNOSIS — Z86.010 HISTORY OF ADENOMATOUS POLYP OF COLON: ICD-10-CM

## 2024-05-14 DIAGNOSIS — K86.81 EXOCRINE PANCREATIC INSUFFICIENCY (HCC): Primary | ICD-10-CM

## 2024-05-14 NOTE — PROGRESS NOTES
Records received and reviewed from [facility]  Dr. Roberto Quarles  [phone]    Postprandial abdominal bloating - fecal elastase 123  Consistent with mild to moderate exocrine pancreatic insufficiency with no prior history of pancreatic disease    Plan of low fat diet, nexium 40mg  Colonoscopy due 2029

## 2024-07-10 ENCOUNTER — TELEPHONE (OUTPATIENT)
Dept: FAMILY MEDICINE CLINIC | Facility: CLINIC | Age: 64
End: 2024-07-10

## 2024-07-10 NOTE — TELEPHONE ENCOUNTER
Letter mailed to patient reminding her she is due for labs per patient reminder.    Lab Frequency Next Occurrence   Lipid Panel [E] Once 07/08/2024     Flora Da Silva RN  P [facility] Clinical Staff  Repeat lipid panel in 3 months

## 2024-07-18 ENCOUNTER — LAB ENCOUNTER (OUTPATIENT)
Dept: LAB | Age: 64
End: 2024-07-18
Attending: NURSE PRACTITIONER
Payer: MEDICARE

## 2024-07-18 DIAGNOSIS — E78.5 HYPERLIPIDEMIA, UNSPECIFIED HYPERLIPIDEMIA TYPE: ICD-10-CM

## 2024-07-18 LAB
CHOLEST SERPL-MCNC: 212 MG/DL (ref ?–200)
FASTING PATIENT LIPID ANSWER: YES
HDLC SERPL-MCNC: 52 MG/DL (ref 40–59)
LDLC SERPL CALC-MCNC: 141 MG/DL (ref ?–100)
NONHDLC SERPL-MCNC: 160 MG/DL (ref ?–130)
TRIGL SERPL-MCNC: 107 MG/DL (ref 30–149)
VLDLC SERPL CALC-MCNC: 20 MG/DL (ref 0–30)

## 2024-07-18 PROCEDURE — 80061 LIPID PANEL: CPT

## 2024-07-18 PROCEDURE — 36415 COLL VENOUS BLD VENIPUNCTURE: CPT

## 2024-07-22 ENCOUNTER — TELEPHONE (OUTPATIENT)
Dept: FAMILY MEDICINE CLINIC | Facility: CLINIC | Age: 64
End: 2024-07-22

## 2024-07-22 NOTE — TELEPHONE ENCOUNTER
Per Germaine ANGULO-Please have patient set up video visit to discuss lab results - no change with the LDL - I recommend starting a medication for cholesterol and would like to discuss plan of care and potential side effects     Spoke with patient, advised note above. Patient states she sees her cardiologist on 8/6/24 and would like to discuss this with them first.    Routing to provider as JACKIE

## 2025-01-18 DIAGNOSIS — E03.9 HYPOTHYROIDISM, UNSPECIFIED TYPE: ICD-10-CM

## 2025-01-18 RX ORDER — LEVOTHYROXINE SODIUM 150 UG/1
150 TABLET ORAL
Qty: 90 TABLET | Refills: 1 | Status: SHIPPED | OUTPATIENT
Start: 2025-01-18

## 2025-01-18 NOTE — TELEPHONE ENCOUNTER
Patient comment: For some reason [facility] keeps telling me they are working on filling this but it isn't getting done.  I am completely out but can use .137 for a few days. I did have a TSH run by my rheumatologist through [facility] in October and it was .491.     Thyroid Medication Protocol [id number]/18/2025 09:02 AM   Protocol Details TSH in past 12 months    Last TSH value is normal    In person appointment or virtual visit in the past 12 mos or appointment in next 3 mos    Medication is active on med list      Routing to provider per protocol.   levothyroxine 150 MCG Oral Tab   Last refilled on 11/20/23 for #90  with 1 rf.   Last labs 7/18/24.   Last seen on 4/2/24.     No future appointments.       Thank you.

## 2025-03-31 ENCOUNTER — OFFICE VISIT (OUTPATIENT)
Dept: FAMILY MEDICINE CLINIC | Facility: CLINIC | Age: 65
End: 2025-03-31
Payer: MEDICARE

## 2025-03-31 VITALS
BODY MASS INDEX: 34 KG/M2 | SYSTOLIC BLOOD PRESSURE: 122 MMHG | RESPIRATION RATE: 16 BRPM | TEMPERATURE: 98 F | HEART RATE: 75 BPM | OXYGEN SATURATION: 96 % | DIASTOLIC BLOOD PRESSURE: 76 MMHG | WEIGHT: 196.38 LBS

## 2025-03-31 DIAGNOSIS — J02.9 SORE THROAT: Primary | ICD-10-CM

## 2025-03-31 LAB
CONTROL LINE PRESENT WITH A CLEAR BACKGROUND (YES/NO): YES YES/NO
KIT LOT #: NORMAL NUMERIC
OPERATOR ID: NORMAL
RAPID SARS-COV-2 BY PCR: NOT DETECTED
STREP GRP A CUL-SCR: NEGATIVE

## 2025-03-31 NOTE — PROGRESS NOTES
CHIEF COMPLAINT:     Chief Complaint   Patient presents with    Sore Throat     S/s for 4 days.  OTC meds taken.          HPI:   Tiffany Hensley is a 64 year old female presents to clinic with complaint of sore throat. Patient has had 4 days. Symptoms have been persisting since onset.  Patient reports following associated symptoms:  none . Patient denies headache. Patient denies nausea.  Patient denies rash. Patient denies history of strep throat. Patient denies strep pharyngitis exposure. Treating symptoms with OTC pain relievers.    Current Outpatient Medications   Medication Sig Dispense Refill    levothyroxine 150 MCG Oral Tab Take 1 tablet (150 mcg total) by mouth before breakfast. 90 tablet 1    amLODIPine-Olmesartan 10-40 MG Oral Tab Take 1 tablet by mouth daily.      Esomeprazole Magnesium 40 MG Oral Capsule Delayed Release Take 1 capsule (40 mg total) by mouth every morning before breakfast. 90 capsule 0    hydroxychloroquine 200 MG Oral Tab Take 1 tablet (200 mg total) by mouth 2 (two) times daily. 180 tablet 1    cyclobenzaprine 5 MG Oral Tab Take 1 tablet (5 mg total) by mouth 3 (three) times daily as needed for Muscle spasms. 90 tablet 0    Vitamin D, Cholecalciferol, 25 MCG (1000 UT) Oral Tab Take by mouth.        Past Medical History:    GERD with esophagitis    Hx of colonic polyps    Tubular adenoma    Hypothyroidism    Lupus (HCC)    Sjogren's disease (HCC)    Uncontrolled hypertension      Social History:  Social History     Socioeconomic History    Marital status:    Tobacco Use    Smoking status: Never    Smokeless tobacco: Never   Vaping Use    Vaping status: Never Used   Substance and Sexual Activity    Alcohol use: Not Currently    Drug use: Never        REVIEW OF SYSTEMS:   GENERAL HEALTH: no change in appetite  SKIN: Per HPI  HEENT: denies ear pain, See HPI  RESPIRATORY: denies shortness of breath or wheezing  CARDIOVASCULAR: denies chest pain or palpitations   GI: denies vomiting  or diarrhea  NEURO: denies dizziness or lightheadedness    EXAM:   /76   Pulse 75   Temp 98.1 °F (36.7 °C) (Oral)   Resp 16   Wt 196 lb 6.4 oz (89.1 kg)   SpO2 96%   BMI 33.71 kg/m²   GENERAL: well developed, well nourished,in no apparent distress  SKIN: no rashes,no suspicious lesions  HEAD: atraumatic, normocephalic  EYES: conjunctiva clear, EOM intact  EARS: TM's clear, non-injected, no bulging, retraction, or fluid bilaterally  NOSE: nostrils patent, clear nasal discharge, nasal mucosa pink and non-inflamed  THROAT: oral mucosa pink, moist. Posterior pharynx is not erythematous and injected. No exudates. Tonsils 1/4.  Breath is not malodorous   NECK: supple  LUNGS: clear to auscultation bilaterally, no wheezes or rhonchi. Breathing is non labored.  CARDIO: RRR without murmur  GI: good BS's,no masses, hepatosplenomegaly, or tenderness on direct palpation  EXTREMITIES: no cyanosis, clubbing or edema  LYMPH: No anterior cervical or submandibular lymphadenopathy.  No posterior cervical or occipital lymphadenopathy.    Recent Results (from the past 24 hours)   Rapid Strep    Collection Time: 03/31/25  2:04 PM   Result Value Ref Range    Strep Grp A Screen Negative Negative    Control Line Present with a clear background (yes/no) Yes Yes/No    Kit Lot # [id number] Numeric    Kit Expiration Date 12/20/2025 Date   Rapid Covid-19    Collection Time: 03/31/25  2:05 PM    Specimen: Nares   Result Value Ref Range    Rapid SARS-CoV-2 by PCR Not Detected Not Detected    POCT Lot Number [id number]     POCT Expiration Date 8/2/2026     POCT Procedure Control Control Valid Control Valid     ID [id number]          ASSESSMENT AND PLAN:   Assessment:     Encounter Diagnosis   Name Primary?    Sore throat Yes       Orders Placed This Encounter   Procedures    Rapid Strep    Rapid Covid-19   NEGATIVE for both    Meds & Refills for this Visit:  Requested Prescriptions      No prescriptions requested or ordered in this  encounter       Imaging & Consults:  None    Plan: Discussed that due to symptoms and negative rapid strep this is most likely viral or allergic in nature and does not require antibiotics.    Comfort measures explained and discussed as listed in Patient Instructions    Follow up with PCP in 3-5 days if not improving, condition worsens, or fever greater than or equal to 100.4 persists for 72 hours.    The patient/parent indicates understanding of these issues and agrees to the plan.

## 2025-04-07 ENCOUNTER — OFFICE VISIT (OUTPATIENT)
Dept: FAMILY MEDICINE CLINIC | Facility: CLINIC | Age: 65
End: 2025-04-07
Payer: MEDICARE

## 2025-04-07 VITALS
TEMPERATURE: 98 F | RESPIRATION RATE: 16 BRPM | HEIGHT: 64 IN | SYSTOLIC BLOOD PRESSURE: 128 MMHG | OXYGEN SATURATION: 97 % | BODY MASS INDEX: 33.46 KG/M2 | HEART RATE: 77 BPM | WEIGHT: 196 LBS | DIASTOLIC BLOOD PRESSURE: 66 MMHG

## 2025-04-07 DIAGNOSIS — J01.80 ACUTE NON-RECURRENT SINUSITIS OF OTHER SINUS: Primary | ICD-10-CM

## 2025-04-07 PROCEDURE — 99213 OFFICE O/P EST LOW 20 MIN: CPT | Performed by: NURSE PRACTITIONER

## 2025-04-07 RX ORDER — HYDROCHLOROTHIAZIDE 25 MG/1
25 TABLET ORAL DAILY
COMMUNITY

## 2025-04-07 RX ORDER — OLMESARTAN MEDOXOMIL 40 MG/1
40 TABLET ORAL DAILY
COMMUNITY

## 2025-04-07 NOTE — PROGRESS NOTES
CHIEF COMPLAINT:    Chief Complaint   Patient presents with    Sinus Problem     Post nasal drainage, had a sore throat last week       HISTORY OF PRESENT ILLNESS:    Tiffany who has a history of GERD and Sjogren's presents today, April 07, 2025, for one health concern.    Sinus problems  Described as nasal drainage and post nasal drip  Began around 03/26/2025, as sore throat, sought care at walk in 03/31/2025  Has been managing symptoms with warm fluids, over the counter decongestants, minimal relief  Positive for cough at night and around 0200 as well as right ear fullness  Denies fever, fatigue, body aches, or ear pain  Denies chest pain, wheezing or pain with deep breathing     ALLERGIES:  Allergies[1]    CURRENT MEDICATIONS:  Current Outpatient Medications   Medication Sig Dispense Refill    Olmesartan Medoxomil 40 MG Oral Tab Take 1 tablet (40 mg total) by mouth daily.      levothyroxine 150 MCG Oral Tab Take 1 tablet (150 mcg total) by mouth before breakfast. 90 tablet 1    Esomeprazole Magnesium 40 MG Oral Capsule Delayed Release Take 1 capsule (40 mg total) by mouth every morning before breakfast. 90 capsule 0    hydroxychloroquine 200 MG Oral Tab Take 1 tablet (200 mg total) by mouth 2 (two) times daily. 180 tablet 1    cyclobenzaprine 5 MG Oral Tab Take 1 tablet (5 mg total) by mouth 3 (three) times daily as needed for Muscle spasms. 90 tablet 0    Vitamin D, Cholecalciferol, 25 MCG (1000 UT) Oral Tab Take by mouth.      hydroCHLOROthiazide 25 MG Oral Tab Take 1 tablet (25 mg total) by mouth daily.         MEDICAL HISTORY:  Past Medical History:    GERD with esophagitis    Hx of colonic polyps    Tubular adenoma    Hypothyroidism    Lupus    Sjogren's disease (HCC)    Uncontrolled hypertension     Past Surgical History:   Procedure Laterality Date    Carpal tunnel release Right 12/2018    Carpal tunnel release Left 2014    Colonoscopy  04/2017    No polyps    Correct bunion,simple Left 1990    Correct  claw finger      Egd  2017    Normal     Family History   Problem Relation Age of Onset    Other (lymphoma) Mother     Hypertension Mother     Cancer Father         kidney    Cancer Sister         leukemia    Other (Other) Sister         Lupus    Cancer Sister     Other (ALS) Sister     Cancer Brother         leukemia    Other (Chronic lymphocytic leukemia) Brother     Cancer Brother         Chronic lymphocytic leukemia    Other (Other) Brother      Family Status   Relation Status    Mo (Not Specified)    Fa (Not Specified)    Sis     Sis Alive    Sis Alive    Bro     Bro      Social History     Socioeconomic History    Marital status:    Tobacco Use    Smoking status: Never    Smokeless tobacco: Never   Vaping Use    Vaping status: Never Used   Substance and Sexual Activity    Alcohol use: Not Currently    Drug use: Never       ROS:  GENERAL:  +HPI  RESPIRATORY:  Denies difficulty breathing  CARDIAC:  Denies chest pain with exertion    VITALS:   /66   Pulse 77   Temp 98 °F (36.7 °C) (Temporal)   Resp 16   Ht 5' 4\" (1.626 m)   Wt 196 lb (88.9 kg)   SpO2 97%   BMI 33.64 kg/m²     Reviewed by Germaine Shabazz MS, APRN, FNP-BC    PHYSICAL EXAM:    Physical Exam  Constitutional:       General: She is not in acute distress.     Appearance: Normal appearance.   HENT:      Head: Normocephalic and atraumatic.      Right Ear: Ear canal and external ear normal.      Left Ear: Ear canal and external ear normal.      Ears:      Comments: Cloudy TM bilat     Nose: Congestion present.      Mouth/Throat:      Mouth: Mucous membranes are moist.      Pharynx: Posterior oropharyngeal erythema present.   Eyes:      General:         Right eye: No discharge.         Left eye: No discharge.   Cardiovascular:      Rate and Rhythm: Normal rate and regular rhythm.   Pulmonary:      Effort: Pulmonary effort is normal.      Breath sounds: Normal breath sounds. No wheezing.   Musculoskeletal:       Cervical back: Neck supple.   Lymphadenopathy:      Cervical: No cervical adenopathy.   Skin:     General: Skin is warm and dry.   Neurological:      General: No focal deficit present.      Mental Status: She is alert and oriented to person, place, and time.   Psychiatric:         Mood and Affect: Mood normal.         Behavior: Behavior normal.         Thought Content: Thought content normal.         Judgment: Judgment normal.          ASSESSMENT & PLAN:    1. Acute non-recurrent sinusitis of other sinus  Saline nasal rinses  Cool mist humidifier  Call in 5 days if symptoms worsening/not improving   Will consider doxycycline 100mg BID x 10 days if needed  - amoxicillin clavulanate 875-125 MG Oral Tab; Take 1 tablet by mouth 2 (two) times daily for 10 days.  Dispense: 20 tablet; Refill: 0       [1]   Allergies  Allergen Reactions    Ciprofloxacin MYALGIA     Rhabdomyolitis    Sulfa Antibiotics RASH     Red rash all over body    Diflucan [Fluconazole] RASH

## 2025-04-16 ENCOUNTER — PATIENT MESSAGE (OUTPATIENT)
Dept: FAMILY MEDICINE CLINIC | Facility: CLINIC | Age: 65
End: 2025-04-16

## 2025-04-21 ENCOUNTER — TELEPHONE (OUTPATIENT)
Dept: FAMILY MEDICINE CLINIC | Facility: CLINIC | Age: 65
End: 2025-04-21

## 2025-04-21 DIAGNOSIS — J01.91 ACUTE RECURRENT SINUSITIS, UNSPECIFIED LOCATION: Primary | ICD-10-CM

## 2025-04-21 RX ORDER — DOXYCYCLINE 100 MG/1
100 TABLET ORAL 2 TIMES DAILY
Qty: 20 TABLET | Refills: 0 | Status: SHIPPED | OUTPATIENT
Start: 2025-04-21 | End: 2025-05-01

## 2025-04-21 NOTE — TELEPHONE ENCOUNTER
Patient's name and  verified   Patient is scheduled with Dr Brannon ENT on May 7 and has physical on 2025  Patient notified and verbalized an understanding

## 2025-04-21 NOTE — TELEPHONE ENCOUNTER
Patient calling, states her symptoms from 04/07 and 04/16 are still persisting. Would like to know what is recommended for further treatment or if she should be evaluated again. Endorsed to RN.

## 2025-04-21 NOTE — TELEPHONE ENCOUNTER
Patient's name and  verified     Patient was seen on 2025 with Germaine Shabazz and was given antibiotic.     Patient is still ear fullness, cough, sinus, post nasal drainage. The symptoms were better for a short time but are back except the sore throat since Wednesday of last week.     Patient is using Sinus rinses, nasal sprays and hot rice bag she wraps her face.     Please Advise

## 2025-04-21 NOTE — TELEPHONE ENCOUNTER
Begin doxycycline, if no improvement, will need to be seen and we'll discuss further testing/referral to ENT

## 2025-04-22 PROBLEM — I73.01 RAYNAUD'S DISEASE WITH GANGRENE (HCC): Status: RESOLVED | Noted: 2025-04-22 | Resolved: 2025-04-22

## 2025-04-29 ENCOUNTER — OFFICE VISIT (OUTPATIENT)
Dept: FAMILY MEDICINE CLINIC | Facility: CLINIC | Age: 65
End: 2025-04-29
Payer: MEDICARE

## 2025-04-29 ENCOUNTER — HOSPITAL ENCOUNTER (OUTPATIENT)
Dept: GENERAL RADIOLOGY | Age: 65
Discharge: HOME | End: 2025-04-29
Attending: NURSE PRACTITIONER
Payer: MEDICARE

## 2025-04-29 VITALS
RESPIRATION RATE: 16 BRPM | OXYGEN SATURATION: 96 % | BODY MASS INDEX: 33.29 KG/M2 | DIASTOLIC BLOOD PRESSURE: 70 MMHG | HEIGHT: 64 IN | TEMPERATURE: 98 F | WEIGHT: 195 LBS | HEART RATE: 73 BPM | SYSTOLIC BLOOD PRESSURE: 138 MMHG

## 2025-04-29 DIAGNOSIS — R09.89 CHEST CONGESTION: ICD-10-CM

## 2025-04-29 DIAGNOSIS — J01.91 ACUTE RECURRENT SINUSITIS, UNSPECIFIED LOCATION: Primary | ICD-10-CM

## 2025-04-29 DIAGNOSIS — R05.1 ACUTE COUGH: ICD-10-CM

## 2025-04-29 DIAGNOSIS — M35.00 SJOGREN'S SYNDROME, WITH UNSPECIFIED ORGAN INVOLVEMENT (HCC): Chronic | ICD-10-CM

## 2025-04-29 DIAGNOSIS — K21.00 GASTROESOPHAGEAL REFLUX DISEASE WITH ESOPHAGITIS WITHOUT HEMORRHAGE: ICD-10-CM

## 2025-04-29 PROCEDURE — 99214 OFFICE O/P EST MOD 30 MIN: CPT | Performed by: NURSE PRACTITIONER

## 2025-04-29 PROCEDURE — 71046 X-RAY EXAM CHEST 2 VIEWS: CPT | Performed by: NURSE PRACTITIONER

## 2025-04-29 RX ORDER — PREDNISONE 10 MG/1
30 TABLET ORAL DAILY
Qty: 15 TABLET | Refills: 0 | Status: SHIPPED | OUTPATIENT
Start: 2025-04-29 | End: 2025-05-04

## 2025-04-29 NOTE — PROGRESS NOTES
CHIEF COMPLAINT:    Chief Complaint   Patient presents with    Cough     Still having cough and possible sinus infection       HISTORY OF PRESENT ILLNESS:    Tiffany who has a history of GERD and Sjogren's presents today, 2025, for recurrent sinus problem.    Sinus problems  Sore throat, post nasal drip, cough, and ear fullness with intermittent pain  Began 2025  Sought care at [facility] 2025, diagnosed with likely viral or allergic rhinosinusitis, reinforced supportive care  Symptoms worsened  Sought care with PCP on 2025  Diagnosed as sinus infection with bilateral ear effusions  Began augmentin on 2025, completed 10 day treatment, symptoms returned the next day and worsened    2025:  Advised to continue nasal rinses and begin azelastine  2025:  Symptoms continued to persist, treated with doxycycline  Today, 2025, reports bilateral ear fullness, sore throat, hoarse voice, chest congestion, productive cough.  Experiencing less sinus pressure than before she took augmentin.  Expresses concern for chest tightness and productive cough.  Would like to be assessed for possible pneumonia.  Denies fevers or pain with deep breathing    ALLERGIES:  Allergies[1]    CURRENT MEDICATIONS:  Current Medications[2]    MEDICAL HISTORY:  Past Medical History[3]  Past Surgical History[4]  Family History[5]  Family Status   Relation Status    Mo (Not Specified)    Fa (Not Specified)    Sis     Sis Alive    Sis Alive    Bro     Bro      Short Social Hx on File[6]    ROS:  GENERAL:  +HPI  RESPIRATORY:  Denies difficulty breathing  CARDIAC:  Denies chest pain with exertion      VITALS:   /70   Pulse 73   Temp 97.8 °F (36.6 °C) (Temporal)   Resp 16   Ht 5' 4\" (1.626 m)   Wt 195 lb (88.5 kg)   SpO2 96%   BMI 33.47 kg/m²     Reviewed by Germaine Shabazz MS, APRN, FNP-BC    PHYSICAL EXAM:    Physical Exam  Constitutional:       General: She is not  in acute distress.     Appearance: Normal appearance.   HENT:      Head: Normocephalic and atraumatic.      Right Ear: Ear canal and external ear normal.      Left Ear: Ear canal and external ear normal.      Ears:      Comments: Tiny air bubbles noted bilaterally, serous.     Nose: Congestion present.      Mouth/Throat:      Pharynx: Posterior oropharyngeal erythema present.      Comments: Voice is hoarse, sounds congested  Eyes:      General:         Right eye: No discharge.         Left eye: No discharge.   Cardiovascular:      Rate and Rhythm: Normal rate and regular rhythm.   Pulmonary:      Effort: Pulmonary effort is normal.      Breath sounds: Normal breath sounds.   Musculoskeletal:      Cervical back: Neck supple.   Skin:     General: Skin is warm and dry.   Neurological:      General: No focal deficit present.      Mental Status: She is alert and oriented to person, place, and time.   Psychiatric:         Mood and Affect: Mood normal.         Behavior: Behavior normal.         Thought Content: Thought content normal.         Judgment: Judgment normal.          ASSESSMENT & PLAN:    1. Acute recurrent sinusitis, unspecified location  Trial prednisone   Follow with ENT to discuss next steps such as alt tx for allergic rhinosinusitis vs. higher dose augmentin  - ENT Referral - In Network  - predniSONE 10 MG Oral Tab; Take 3 tablets (30 mg total) by mouth daily for 5 days. Take in the mornings to avoid interference with sleep quality  Dispense: 15 tablet; Refill: 0    2. Sjogren's syndrome, with unspecified organ involvement (HCC)  Prefers to avoid antihistamines if possible due to side effects/dryness  - ENT Referral - In Network    3. Gastroesophageal reflux disease with esophagitis without hemorrhage  Considering GERD as contributing factor of recurrent rhinosinusitis  - ENT Referral - In Network    4. Acute cough  Pneumonia low on ddx  Discussed physical findings and diagnostic testing options, patient  prefers to proceed with CXR  - XR CHEST PA + LAT CHEST (CPT=71046); Future  - predniSONE 10 MG Oral Tab; Take 3 tablets (30 mg total) by mouth daily for 5 days. Take in the mornings to avoid interference with sleep quality  Dispense: 15 tablet; Refill: 0    5. Chest congestion  Pneumonia low on ddx   Discussed physical findings and diagnostic testing options, patient prefers to proceed with CXR  - XR CHEST PA + LAT CHEST (CPT=71046); Future  - predniSONE 10 MG Oral Tab; Take 3 tablets (30 mg total) by mouth daily for 5 days. Take in the mornings to avoid interference with sleep quality  Dispense: 15 tablet; Refill: 0    If CXR negative for pneumonia prednisone burst x 3-5 days, wait for ENT to determine if we need high dose augmentin  Follow-up with ENT team         [1]   Allergies  Allergen Reactions    Ciprofloxacin MYALGIA     Rhabdomyolitis    Sulfa Antibiotics RASH     Red rash all over body    Diflucan [Fluconazole] RASH   [2]   Current Outpatient Medications   Medication Sig Dispense Refill    Doxycycline Monohydrate 100 MG Oral Tab Take 100 mg by mouth in the morning and 100 mg before bedtime. Do all this for 10 days. 20 tablet 0    Olmesartan Medoxomil 40 MG Oral Tab Take 1 tablet (40 mg total) by mouth daily.      hydroCHLOROthiazide 25 MG Oral Tab Take 1 tablet (25 mg total) by mouth daily.      levothyroxine 150 MCG Oral Tab Take 1 tablet (150 mcg total) by mouth before breakfast. 90 tablet 1    Esomeprazole Magnesium 40 MG Oral Capsule Delayed Release Take 1 capsule (40 mg total) by mouth every morning before breakfast. 90 capsule 0    hydroxychloroquine 200 MG Oral Tab Take 1 tablet (200 mg total) by mouth 2 (two) times daily. 180 tablet 1    cyclobenzaprine 5 MG Oral Tab Take 1 tablet (5 mg total) by mouth 3 (three) times daily as needed for Muscle spasms. 90 tablet 0    Vitamin D, Cholecalciferol, 25 MCG (1000 UT) Oral Tab Take by mouth.     [3]   Past Medical History:   GERD with esophagitis    Hx  of colonic polyps    Tubular adenoma    Hypothyroidism    Lupus    Sjogren's disease (HCC)    Uncontrolled hypertension   [4]   Past Surgical History:  Procedure Laterality Date    Carpal tunnel release Right 12/2018    Carpal tunnel release Left 2014    Colonoscopy  04/2017    No polyps    Correct bunion,simple Left 1990    Correct claw finger      Egd  04/2017    Normal   [5]   Family History  Problem Relation Age of Onset    Other (lymphoma) Mother     Hypertension Mother     Cancer Father         kidney    Cancer Sister         leukemia    Other (Other) Sister         Lupus    Cancer Sister     Other (ALS) Sister     Cancer Brother         leukemia    Other (Chronic lymphocytic leukemia) Brother     Cancer Brother         Chronic lymphocytic leukemia    Other (Other) Brother    [6]   Social History  Socioeconomic History    Marital status:    Tobacco Use    Smoking status: Never    Smokeless tobacco: Never   Vaping Use    Vaping status: Never Used   Substance and Sexual Activity    Alcohol use: Not Currently    Drug use: Never

## 2025-05-07 ENCOUNTER — OFFICE VISIT (OUTPATIENT)
Facility: LOCATION | Age: 65
End: 2025-05-07
Payer: MEDICARE

## 2025-05-07 DIAGNOSIS — R49.0 HOARSENESS: ICD-10-CM

## 2025-05-07 DIAGNOSIS — J32.8 OTHER CHRONIC SINUSITIS: Primary | ICD-10-CM

## 2025-05-07 PROCEDURE — 31575 DIAGNOSTIC LARYNGOSCOPY: CPT | Performed by: OTOLARYNGOLOGY

## 2025-05-07 PROCEDURE — 99203 OFFICE O/P NEW LOW 30 MIN: CPT | Performed by: OTOLARYNGOLOGY

## 2025-05-07 RX ORDER — PREDNISONE 10 MG/1
10 TABLET ORAL DAILY
Qty: 7 TABLET | Refills: 0 | Status: SHIPPED | OUTPATIENT
Start: 2025-05-07

## 2025-05-07 RX ORDER — FLUTICASONE PROPIONATE 50 MCG
2 SPRAY, SUSPENSION (ML) NASAL DAILY
Qty: 16 G | Refills: 3 | Status: SHIPPED | OUTPATIENT
Start: 2025-05-07

## 2025-05-07 NOTE — PROGRESS NOTES
Tiffany Hensley is a 64 year old female. No chief complaint on file.    HPI:   She has a history of Sjogren's.  She has had chronic sinus problems all winter.  It will come and go.  She gets sinus headaches postnasal drip and hoarseness.  She went to the urgent care with a severe sore throat.  She was given Augmentin and then got better.  She got worse again and was given doxycycline and finally prednisone which she finished over the weekend.  She denies significant allergies.  Current Medications[1]   Past Medical History[2]   Social History:  Short Social Hx on File[3]   Past Surgical History[4]      REVIEW OF SYSTEMS:   GENERAL HEALTH: feels well otherwise  GENERAL : denies fever, chills, sweats, weight loss, weight gain  SKIN: denies any unusual skin lesions or rashes  RESPIRATORY: denies shortness of breath with exertion  NEURO: denies headaches    EXAM:   There were no vitals taken for this visit.    System Findings Details   Constitutional  Overall appearance - Normal.   Psychiatric  Orientation - Oriented to time, place, person & situation. Appropriate mood and affect.   Head/Face  Facial features -- Normal. Skull - Normal.   Eyes  Pupils equal ,round ,react to light and accomidate   Ears, Nose, Throat, Neck  Ears clear nose erythema oropharynx cobblestoning neck no masses   Neurological  Memory - Normal. Cranial nerves - Cranial nerves II through XII grossly intact.   Lymph Detail  Submental. Submandibular. Anterior cervical. Posterior cervical. Supraclavicular.     Flexible Laryngoscopy Procedure Note (68196)    Due to inability for adequate examination of the larynx and need for magnification to perform the examination, endoscopy was performed.  Risks and benefits were discussed with patient/family and they have given verbal consent to proceed.    Pre-operative Diagnosis:   1. Other chronic sinusitis    2. Hoarseness        Post-operative Diagnosis: Same    Procedure: Diagnostic flexible fiberoptic  laryngoscopy    Anesthesia: topical lidocaine    Surgeon: Artemio Nuñez MD    EBL: 0cc    Procedure Detail & Findings: There is some thick postnasal drip but not purulent no polyps base of tongue epiglottis and vocal cords otherwise normal.    Condition: Stable    Complications: Patient tolerated the procedure well with no immediate complications.      Artemio Nuñez MD    ASSESSMENT AND PLAN:   1. Other chronic sinusitis  I believe she has had a chronic sinus infection over the winter.  She took Augmentin doxycycline and prednisone.  I believe she is through the infection but the inflammation persist.  She will take 1 more week of prednisone.  She will add nasal saline and Flonase for a while.  If she gets worse again she will call back and I will get her in right away for a CT sinus at the office.    2. Hoarseness  Vocal cords otherwise look good.  This is likely due to postnasal drip.      The patient indicates understanding of these issues and agrees to the plan.    No follow-ups on file.    Artemio Nuñez MD  5/7/2025  9:31 AM       [1]   Current Outpatient Medications   Medication Sig Dispense Refill    predniSONE 10 MG Oral Tab Take 1 tablet (10 mg total) by mouth daily. 7 tablet 0    fluticasone propionate 50 MCG/ACT Nasal Suspension 2 sprays by Nasal route daily. 16 g 3    Olmesartan Medoxomil 40 MG Oral Tab Take 1 tablet (40 mg total) by mouth daily.      hydroCHLOROthiazide 25 MG Oral Tab Take 1 tablet (25 mg total) by mouth daily.      levothyroxine 150 MCG Oral Tab Take 1 tablet (150 mcg total) by mouth before breakfast. 90 tablet 1    Esomeprazole Magnesium 40 MG Oral Capsule Delayed Release Take 1 capsule (40 mg total) by mouth every morning before breakfast. 90 capsule 0    hydroxychloroquine 200 MG Oral Tab Take 1 tablet (200 mg total) by mouth 2 (two) times daily. 180 tablet 1    cyclobenzaprine 5 MG Oral Tab Take 1 tablet (5 mg total) by mouth 3 (three) times daily as needed for Muscle  spasms. 90 tablet 0    Vitamin D, Cholecalciferol, 25 MCG (1000 UT) Oral Tab Take by mouth.     [2]   Past Medical History:   GERD with esophagitis    Hx of colonic polyps    Tubular adenoma    Hypothyroidism    Lupus    Sjogren's disease (HCC)    Uncontrolled hypertension   [3]   Social History  Socioeconomic History    Marital status:    Tobacco Use    Smoking status: Never    Smokeless tobacco: Never   Vaping Use    Vaping status: Never Used   Substance and Sexual Activity    Alcohol use: Not Currently    Drug use: Never   [4]   Past Surgical History:  Procedure Laterality Date    Carpal tunnel release Right 12/2018    Carpal tunnel release Left 2014    Colonoscopy  04/2017    No polyps    Correct bunion,simple Left 1990    Correct claw finger      Egd  04/2017    Normal

## 2025-05-19 ENCOUNTER — OFFICE VISIT (OUTPATIENT)
Dept: FAMILY MEDICINE CLINIC | Facility: CLINIC | Age: 65
End: 2025-05-19
Payer: MEDICARE

## 2025-05-19 VITALS
BODY MASS INDEX: 32.78 KG/M2 | SYSTOLIC BLOOD PRESSURE: 110 MMHG | HEIGHT: 64 IN | TEMPERATURE: 98 F | DIASTOLIC BLOOD PRESSURE: 62 MMHG | HEART RATE: 68 BPM | OXYGEN SATURATION: 97 % | RESPIRATION RATE: 16 BRPM | WEIGHT: 192 LBS

## 2025-05-19 DIAGNOSIS — I70.0 ARTERIOSCLEROSIS OF ABDOMINAL AORTA: ICD-10-CM

## 2025-05-19 DIAGNOSIS — M35.00 SJOGREN'S SYNDROME, WITH UNSPECIFIED ORGAN INVOLVEMENT (HCC): Chronic | ICD-10-CM

## 2025-05-19 DIAGNOSIS — Z12.31 ENCOUNTER FOR SCREENING MAMMOGRAM FOR MALIGNANT NEOPLASM OF BREAST: ICD-10-CM

## 2025-05-19 DIAGNOSIS — Z86.0101 HISTORY OF ADENOMATOUS POLYP OF COLON: ICD-10-CM

## 2025-05-19 DIAGNOSIS — E78.00 ELEVATED LDL CHOLESTEROL LEVEL: ICD-10-CM

## 2025-05-19 DIAGNOSIS — M32.9 SYSTEMIC LUPUS ERYTHEMATOSUS, UNSPECIFIED SLE TYPE, UNSPECIFIED ORGAN INVOLVEMENT STATUS (HCC): ICD-10-CM

## 2025-05-19 DIAGNOSIS — Z00.00 ENCOUNTER FOR ANNUAL HEALTH EXAMINATION: Primary | ICD-10-CM

## 2025-05-19 DIAGNOSIS — I10 PRIMARY HYPERTENSION: ICD-10-CM

## 2025-05-19 DIAGNOSIS — K21.00 GASTROESOPHAGEAL REFLUX DISEASE WITH ESOPHAGITIS WITHOUT HEMORRHAGE: ICD-10-CM

## 2025-05-19 DIAGNOSIS — E03.9 ACQUIRED HYPOTHYROIDISM: ICD-10-CM

## 2025-05-19 DIAGNOSIS — K86.81 EXOCRINE PANCREATIC INSUFFICIENCY (HCC): ICD-10-CM

## 2025-05-19 PROCEDURE — G0439 PPPS, SUBSEQ VISIT: HCPCS | Performed by: NURSE PRACTITIONER

## 2025-05-19 RX ORDER — BERBERINE CHLOR/SEAWEED/CHROM 500-250 MG
500 CAPSULE ORAL NIGHTLY
Qty: 30 CAPSULE | Refills: 0 | COMMUNITY
Start: 2025-05-19

## 2025-05-19 RX ORDER — ESOMEPRAZOLE MAGNESIUM 40 MG/1
40 CAPSULE, DELAYED RELEASE ORAL
Qty: 90 CAPSULE | Refills: 0 | Status: SHIPPED | OUTPATIENT
Start: 2025-05-19

## 2025-05-19 NOTE — PROGRESS NOTES
Subjective:   Tiffany Hensley is a 64 year old female who presents for a Medicare Subsequent Annual Wellness visit (Pt already had Initial Annual Wellness) and scheduled follow up of multiple significant but stable problems.   History of Present Illness            Follows a regular diet, low FODMAP diet, makes food from scratch  Physically active    Pap due 06/17/2025 - Patsy Sanchez with [facility]   Mammogram overdue as of 05/01/2025, order provided  Under the care of Dr. Quarles, gastroenterology out of [place], [place]  Duly managing SLE and Sjogrens     Declines statin therapy, mychart message sent regarding more natural approach such as berberine  Labs 1 week after starting berberine to assess liver health  Follow-up 6 months blood pressure check and fasting labs    History/Other:   Fall Risk Assessment:   She has been screened for Falls and is low risk.      Cognitive Assessment:   She had a completely normal cognitive assessment - see flowsheet entries     Functional Ability/Status:   Tiffany Hensley has a completely normal functional assessment. See flowsheet for details.      Depression Screening (PHQ):  PHQ-2 SCORE: 0  , done 5/19/2025   Last [place] Suicide Screening on 5/19/2025 was No Risk.     <5 minutes spent screening and counseling for depression    Advanced Directives:   She does have a Living Will but we do NOT have it on file in Epic.    She does have a POA but we do NOT have it on file in Epic.    Not discussed      Patient Active Problem List   Diagnosis    Hypothyroidism    Sjogren's disease (HCC)    History of adenomatous polyp of colon    Esophageal reflux    Hypertension    Exocrine pancreatic insufficiency (HCC)     Allergies:  She is allergic to ciprofloxacin, sulfa antibiotics, and diflucan [fluconazole].    Current Medications:  No outpatient medications have been marked as taking for the 5/19/25 encounter (Office Visit) with Germaine Shabazz APRN.       Medical History:  She  has a past  medical history of GERD with esophagitis, colonic polyps, Hypothyroidism, Lupus, Sjogren's disease (HCC), and Uncontrolled hypertension.  Surgical History:  She  has a past surgical history that includes colonoscopy (04/2017); egd (04/2017); carpal tunnel release (Right, 12/2018); carpal tunnel release (Left, 2014); correct claw finger; and correct bunion,simple (Left, 1990).   Family History:  Her family history includes ALS in her sister; Cancer in her brother, brother, father, sister, and sister; Chronic lymphocytic leukemia in her brother; Hypertension in her mother; Other in her brother and sister; lymphoma in her mother.  Social History:  She  reports that she has never smoked. She has never used smokeless tobacco. She reports that she does not currently use alcohol. She reports that she does not use drugs.    Tobacco:  She has never smoked tobacco.    CAGE Alcohol Screen:   CAGE screening score of 0 on 5/19/2025, showing low risk of alcohol abuse.      Patient Care Team:  Mauri Hernandez DO as PCP - General (Family Medicine)  Sussy Avilez DO (RHEUMATOLOGY)  Roberto Quarles DO (GASTROENTEROLOGY)  Germaine Shabazz APRN (Nurse Practitioner Family)    Review of Systems  GENERAL:  Denies fever  RESPIRATORY:  Denies difficulty breathing  CARDIAC:  Denies chest pain with exertion  GI:  Denies blood in stool  :  Denies blood in urine  NEURO:  Denies recent falls   MSKL:  Denies joint stiffness  SKIN:  Denies change in texture of moles   PSYCH: Denies thoughts of self harm or harming others     Objective:   Physical Exam    Constitutional:       Appearance: Normal appearance.  Sitting upright on exam table.  Well developed, well nourished, and in no acute distress.  HEENT:      Grossly normal hearing.       Head: Facial features symmetric. Normocephalic and atraumatic.      Right Ear: Canal clear without erythema or drainage.  TM clear and intact, neutral in position.      Left Ear: Canal clear without  erythema or drainage.  TM clear and intact, neutral in position.      Nose: Nose normal. Nares patent bilaterally.     Mouth/Throat: Buccal mucosa is moist.  Uvula rises midline.  Posterior pharynx nonerythematous.      Extraocular Movements: Extraocular movements intact.      Conjunctiva/sclera: Conjunctivae normal. Sclera anicteric         Pupils: Pupils are equal, round, and reactive to light.   Neck:     Neck is supple. Trachea is midline.  No lymphadenopathy.  Cardiovascular:      Rate and Rhythm: Normal rate and regular rhythm.      Heart sounds: Normal heart sounds. No murmur heard.  Pulmonary:      Effort: Pulmonary effort is normal.      Breath sounds: Lungs clear throughout.     No cough or wheezing.  Abdominal:      General: Abdomen is nondistended, soft, nontender.  No organomegaly.    Musculoskeletal:         General: Normal range of motion. Shoulders are symmetric in height.  Strength of extremities are equal bilaterally.     Range of motion without limitations.  Skin:     General: Skin is warm and dry.      Coloration: Skin is not jaundiced.      Findings: No bruising or rash.   Neurological:      General: No focal deficit present. Speech is clear and organized.     Mental Status: Alert and oriented to person, place, and time.      Sensory: Sensation is intact.      Motor: Motor function is intact. Movements are smooth and controlled without ataxia.     Coordination: Coordination is intact. Coordination normal.      Gait: Gait is intact. Gait steady and nonantalgic.      Deep Tendon Reflexes: Reflexes 2+ bilaterally.  Psychiatric:         Mood and Affect: Mood normal.         Behavior: Behavior normal.         Thought Content: Thought content normal.         Judgment: Judgment normal.       /62   Pulse 68   Temp 97.8 °F (36.6 °C) (Temporal)   Resp 16   Ht 5' 4\" (1.626 m)   Wt 192 lb (87.1 kg)   SpO2 97%   BMI 32.96 kg/m²  Estimated body mass index is 32.96 kg/m² as calculated from the  following:    Height as of this encounter: 5' 4\" (1.626 m).    Weight as of this encounter: 192 lb (87.1 kg).    Medicare Hearing Assessment:   Hearing Screening    Time taken: 5/19/2025  3:07 PM  Entry User: Latisha Rosa CMA  Screening Method: Finger Rub  Finger Rub Result: Pass         Visual Acuity:   Right Eye Visual Acuity: Corrected Right Eye Chart Acuity: 20/30   Left Eye Visual Acuity: Corrected Left Eye Chart Acuity: 20/25   Both Eyes Visual Acuity: Corrected Both Eyes Chart Acuity: 20/25   Able To Tolerate Visual Acuity: Yes        Assessment & Plan:   Tiffany Hensley is a 64 year old female who presents for a Medicare Assessment.     1. Encounter for annual health examination  Labs completed 10/12/2025, patient would like repeat labs due to fhx of cancer and current medications  Pap due 06/17/2025 - Patsy Sanchez with [facility]  Mammogram overdue as of 05/01/2025, order provided    History of elevated LDL, declines statin therapy  Mychart message sent regarding over the counter alt therapy:  berberine  Labs 1 week after starting berberine to assess liver health  Follow-up 6 months blood pressure check and fasting labs    ENT:  Dr. Nuñez ([place])  Cardiology:  Dr. Thomas ([place])  Rheumatology: Dr. Avilez ([place])  Gastroenterology:  Dr. Quarles ([place])    - Lipid Panel [E]; Future  - [facility] MARE 2D+3D SCREENING BILAT (CPT=77067/45004); Future  - CBC With Differential With Platelet; Future  - TSH and Free T4; Future  - Comp Metabolic Panel (14); Future  - Lipid Panel; Future  - Esomeprazole Magnesium 40 MG Oral Capsule Delayed Release; Take 1 capsule (40 mg total) by mouth every morning before breakfast.  Dispense: 90 capsule; Refill: 0  - Berberine Chloride (BERBERINE HCI) 500 MG Oral Cap; Take 500 mg by mouth at bedtime.  Dispense: 30 capsule; Refill: 0    2. Elevated LDL cholesterol level  Declines statin  See #1  - Lipid Panel [E]; Future  - Lipid Panel; Future  - Berberine  Chloride (BERBERINE HCI) 500 MG Oral Cap; Take 500 mg by mouth at bedtime.  Dispense: 30 capsule; Refill: 0    3. Arteriosclerosis of abdominal aorta  Continue to work towards control of LDL  See #1  - Lipid Panel [E]; Future  - Lipid Panel; Future    4. Encounter for screening mammogram for malignant neoplasm of breast  Asymptomatic  - [facility] MARE 2D+3D SCREENING BILAT (CPT=77067/64710); Future    5. Sjogren's syndrome, with unspecified organ involvement (HCC)  Stable  No changes in plan of care  - CBC With Differential With Platelet; Future  - Comp Metabolic Panel (14); Future    6. Primary hypertension  Improving  Continue olmesartan 40mg  Continue hydrochlorothiazide 25mg  BP Readings from Last 3 Encounters:   05/19/25 110/62   04/29/25 138/70   04/07/25 128/66   - TSH and Free T4; Future  - Comp Metabolic Panel (14); Future    7. Acquired hypothyroidism  Continue levothyroxine 150mcg  Stable TSH 10/12/2024  Rechecking to confirm stability  - TSH and Free T4; Future  - Comp Metabolic Panel (14); Future    8. Gastroesophageal reflux disease with esophagitis without hemorrhage  Stable  No change in care plan  - CBC With Differential With Platelet; Future  - Esomeprazole Magnesium 40 MG Oral Capsule Delayed Release; Take 1 capsule (40 mg total) by mouth every morning before breakfast.  Dispense: 90 capsule; Refill: 0    9. History of adenomatous polyp of colon  Continue following with gastroenterology team  See #1  - CBC With Differential With Platelet; Future    10. Exocrine pancreatic insufficiency (HCC)  Continue following with gastroenterology team  See #1  - CBC With Differential With Platelet; Future    11. Systemic lupus erythematosus, unspecified SLE type, unspecified organ involvement status (HCC)  Continue following with rheumatology team  See #1  - CBC With Differential With Platelet; Future            The patient indicates understanding of these issues and agrees to the plan.  Imaging studies  ordered.  Recommended OTC medications- Berberine  Reinforced healthy diet, lifestyle, and exercise.      Return in 6 months (on 11/19/2025) for blood pressure check with Dr. Hernandez.     ADELE Pace, 5/19/2025     Supplementary Documentation:   General Health:  In the past six months, have you lost more than 10 pounds without trying?: (Patient-Rptd) 2 - No  Has your appetite been poor?: (Patient-Rptd) No  Type of Diet: (Patient-Rptd) Balanced  How does the patient maintain a good energy level?: (Patient-Rptd) Other  How would you describe your daily physical activity?: (Patient-Rptd) Light  How would you describe your current health state?: (Patient-Rptd) Good  How do you maintain positive mental well-being?: (Patient-Rptd) Social Interaction, Puzzles, Visiting Friends, Visiting Family  On a scale of 0 to 10, with 0 being no pain and 10 being severe pain, what is your pain level?: (Patient-Rptd) 0 - (None)  In the past six months, have you experienced urine leakage?: (Patient-Rptd) 1-Yes  At any time do you feel concerned for the safety/well-being of yourself and/or your children, in your home or elsewhere?: (Patient-Rptd) No  Have you had any immunizations at another office such as Influenza, Hepatitis B, Tetanus, or Pneumococcal?: (Patient-Rptd) No    Health Maintenance   Topic Date Due    Pneumococcal Vaccine: 50+ Years (1 of 1 - PCV) Never done    COVID-19 Vaccine (1 - 2024-25 season) Never done    Annual Depression Screening  01/01/2025    Annual Physical  04/02/2025    Mammogram  04/10/2025    Pap Smear  06/17/2025    Influenza Vaccine (Season Ended) 10/01/2025    Colorectal Cancer Screening  01/31/2029    DEXA Scan  Completed    Fall Risk Screening (Annual)  Completed    Zoster Vaccines  Completed    Meningococcal B Vaccine  Aged Out

## 2025-05-20 ENCOUNTER — MED REC SCAN ONLY (OUTPATIENT)
Dept: FAMILY MEDICINE CLINIC | Facility: CLINIC | Age: 65
End: 2025-05-20

## 2025-06-05 ENCOUNTER — LAB ENCOUNTER (OUTPATIENT)
Dept: LAB | Age: 65
End: 2025-06-05
Attending: NURSE PRACTITIONER
Payer: MEDICARE

## 2025-06-05 DIAGNOSIS — I70.0 ARTERIOSCLEROSIS OF ABDOMINAL AORTA: ICD-10-CM

## 2025-06-05 DIAGNOSIS — K21.00 GASTROESOPHAGEAL REFLUX DISEASE WITH ESOPHAGITIS WITHOUT HEMORRHAGE: ICD-10-CM

## 2025-06-05 DIAGNOSIS — I10 PRIMARY HYPERTENSION: ICD-10-CM

## 2025-06-05 DIAGNOSIS — M35.00 SJOGREN'S SYNDROME, WITH UNSPECIFIED ORGAN INVOLVEMENT (HCC): Chronic | ICD-10-CM

## 2025-06-05 DIAGNOSIS — K86.81 EXOCRINE PANCREATIC INSUFFICIENCY (HCC): ICD-10-CM

## 2025-06-05 DIAGNOSIS — Z86.0101 HISTORY OF ADENOMATOUS POLYP OF COLON: ICD-10-CM

## 2025-06-05 DIAGNOSIS — M32.9 SYSTEMIC LUPUS ERYTHEMATOSUS, UNSPECIFIED SLE TYPE, UNSPECIFIED ORGAN INVOLVEMENT STATUS (HCC): ICD-10-CM

## 2025-06-05 DIAGNOSIS — E78.00 ELEVATED LDL CHOLESTEROL LEVEL: ICD-10-CM

## 2025-06-05 DIAGNOSIS — Z00.00 ENCOUNTER FOR ANNUAL HEALTH EXAMINATION: ICD-10-CM

## 2025-06-05 DIAGNOSIS — E03.9 ACQUIRED HYPOTHYROIDISM: ICD-10-CM

## 2025-06-05 LAB
ALBUMIN SERPL-MCNC: 4.6 G/DL (ref 3.2–4.8)
ALBUMIN/GLOB SERPL: 1.7 {RATIO} (ref 1–2)
ALP LIVER SERPL-CCNC: 46 U/L (ref 55–142)
ALT SERPL-CCNC: 32 U/L (ref 10–49)
ANION GAP SERPL CALC-SCNC: 10 MMOL/L (ref 0–18)
AST SERPL-CCNC: 41 U/L (ref ?–34)
BASOPHILS # BLD AUTO: 0.06 X10(3) UL (ref 0–0.2)
BASOPHILS NFR BLD AUTO: 1.1 %
BILIRUB SERPL-MCNC: 0.3 MG/DL (ref 0.2–1.1)
BUN BLD-MCNC: 10 MG/DL (ref 9–23)
CALCIUM BLD-MCNC: 9.4 MG/DL (ref 8.7–10.6)
CHLORIDE SERPL-SCNC: 104 MMOL/L (ref 98–112)
CHOLEST SERPL-MCNC: 212 MG/DL (ref ?–200)
CO2 SERPL-SCNC: 27 MMOL/L (ref 21–32)
CREAT BLD-MCNC: 0.91 MG/DL (ref 0.55–1.02)
EGFRCR SERPLBLD CKD-EPI 2021: 70 ML/MIN/1.73M2 (ref 60–?)
EOSINOPHIL # BLD AUTO: 0 X10(3) UL (ref 0–0.7)
EOSINOPHIL NFR BLD AUTO: 0 %
ERYTHROCYTE [DISTWIDTH] IN BLOOD BY AUTOMATED COUNT: 13.2 %
FASTING PATIENT LIPID ANSWER: YES
FASTING STATUS PATIENT QL REPORTED: YES
GLOBULIN PLAS-MCNC: 2.7 G/DL (ref 2–3.5)
GLUCOSE BLD-MCNC: 100 MG/DL (ref 70–99)
HCT VFR BLD AUTO: 42.7 % (ref 35–48)
HDLC SERPL-MCNC: 57 MG/DL (ref 40–59)
HGB BLD-MCNC: 13.6 G/DL (ref 12–16)
IMM GRANULOCYTES # BLD AUTO: 0.02 X10(3) UL (ref 0–1)
IMM GRANULOCYTES NFR BLD: 0.4 %
LDLC SERPL CALC-MCNC: 131 MG/DL (ref ?–100)
LYMPHOCYTES # BLD AUTO: 2.73 X10(3) UL (ref 1–4)
LYMPHOCYTES NFR BLD AUTO: 51.8 %
MCH RBC QN AUTO: 30.2 PG (ref 26–34)
MCHC RBC AUTO-ENTMCNC: 31.9 G/DL (ref 31–37)
MCV RBC AUTO: 94.9 FL (ref 80–100)
MONOCYTES # BLD AUTO: 0.51 X10(3) UL (ref 0.1–1)
MONOCYTES NFR BLD AUTO: 9.7 %
NEUTROPHILS # BLD AUTO: 1.95 X10 (3) UL (ref 1.5–7.7)
NEUTROPHILS # BLD AUTO: 1.95 X10(3) UL (ref 1.5–7.7)
NEUTROPHILS NFR BLD AUTO: 37 %
NONHDLC SERPL-MCNC: 155 MG/DL (ref ?–130)
OSMOLALITY SERPL CALC.SUM OF ELEC: 291 MOSM/KG (ref 275–295)
PLATELET # BLD AUTO: 260 10(3)UL (ref 150–450)
POTASSIUM SERPL-SCNC: 4.8 MMOL/L (ref 3.5–5.1)
PROT SERPL-MCNC: 7.3 G/DL (ref 5.7–8.2)
RBC # BLD AUTO: 4.5 X10(6)UL (ref 3.8–5.3)
SODIUM SERPL-SCNC: 141 MMOL/L (ref 136–145)
T4 FREE SERPL-MCNC: 1.8 NG/DL (ref 0.8–1.7)
TRIGL SERPL-MCNC: 136 MG/DL (ref 30–149)
TSI SER-ACNC: 0.97 UIU/ML (ref 0.55–4.78)
VLDLC SERPL CALC-MCNC: 25 MG/DL (ref 0–30)
WBC # BLD AUTO: 5.3 X10(3) UL (ref 4–11)

## 2025-06-05 PROCEDURE — 36415 COLL VENOUS BLD VENIPUNCTURE: CPT

## 2025-06-05 PROCEDURE — 85025 COMPLETE CBC W/AUTO DIFF WBC: CPT

## 2025-06-05 PROCEDURE — 80053 COMPREHEN METABOLIC PANEL: CPT

## 2025-06-05 PROCEDURE — 80061 LIPID PANEL: CPT

## 2025-06-05 PROCEDURE — 84443 ASSAY THYROID STIM HORMONE: CPT

## 2025-06-05 PROCEDURE — 84439 ASSAY OF FREE THYROXINE: CPT

## 2025-06-11 ENCOUNTER — TELEPHONE (OUTPATIENT)
Dept: FAMILY MEDICINE CLINIC | Facility: CLINIC | Age: 65
End: 2025-06-11

## 2025-06-11 DIAGNOSIS — E03.9 HYPOTHYROIDISM, UNSPECIFIED TYPE: ICD-10-CM

## 2025-06-11 PROBLEM — E78.00 ELEVATED LDL CHOLESTEROL LEVEL: Status: ACTIVE | Noted: 2025-06-11

## 2025-06-11 RX ORDER — LEVOTHYROXINE SODIUM 137 UG/1
137 TABLET ORAL
Qty: 90 TABLET | Refills: 1 | Status: SHIPPED | OUTPATIENT
Start: 2025-06-11

## 2025-06-11 NOTE — TELEPHONE ENCOUNTER
Fax from [facility] to clarify levothyroxine script that was sent over today    Dose was sent over as 137 mcg but instructions do not match    Per notes from Germaine Shabazz :06/11/2025:  Decrease levothyroxine from 150mcg to 137mcg.  Recheck TSH in July.  Goal TSH closer to 3 for now due to age group. Sherman/marko    Sending corrected script to the pharmacy    Called the pharmacy and cancelled the previous order

## 2025-07-01 DIAGNOSIS — E03.9 HYPOTHYROIDISM, UNSPECIFIED TYPE: ICD-10-CM

## 2025-07-04 RX ORDER — LEVOTHYROXINE SODIUM 150 UG/1
150 TABLET ORAL
Qty: 90 TABLET | Refills: 1 | OUTPATIENT
Start: 2025-07-04

## 2025-07-28 ENCOUNTER — PATIENT MESSAGE (OUTPATIENT)
Dept: FAMILY MEDICINE CLINIC | Facility: CLINIC | Age: 65
End: 2025-07-28

## 2025-08-12 ENCOUNTER — LAB ENCOUNTER (OUTPATIENT)
Dept: LAB | Age: 65
End: 2025-08-12
Attending: NURSE PRACTITIONER

## 2025-08-12 ENCOUNTER — LAB ENCOUNTER (OUTPATIENT)
Dept: LAB | Age: 65
End: 2025-08-12
Attending: INTERNAL MEDICINE

## 2025-08-12 DIAGNOSIS — I70.0 ARTERIOSCLEROSIS OF ABDOMINAL AORTA: ICD-10-CM

## 2025-08-12 DIAGNOSIS — M35.00 SICCA SYNDROME (HCC): Primary | ICD-10-CM

## 2025-08-12 DIAGNOSIS — Z79.899 ENCOUNTER FOR LONG-TERM (CURRENT) DRUG USE: ICD-10-CM

## 2025-08-12 DIAGNOSIS — E78.00 ELEVATED LDL CHOLESTEROL LEVEL: ICD-10-CM

## 2025-08-12 DIAGNOSIS — Z00.00 ENCOUNTER FOR ANNUAL HEALTH EXAMINATION: ICD-10-CM

## 2025-08-12 DIAGNOSIS — E03.9 HYPOTHYROIDISM, UNSPECIFIED TYPE: ICD-10-CM

## 2025-08-12 LAB
ALBUMIN SERPL-MCNC: 4.5 G/DL (ref 3.2–4.8)
ALBUMIN/GLOB SERPL: 1.5 (ref 1–2)
ALP LIVER SERPL-CCNC: 49 U/L (ref 55–142)
ALT SERPL-CCNC: 28 U/L (ref 10–49)
ANION GAP SERPL CALC-SCNC: 16 MMOL/L (ref 0–18)
AST SERPL-CCNC: 39 U/L (ref ?–34)
BASOPHILS # BLD AUTO: 0.06 X10(3) UL (ref 0–0.2)
BASOPHILS NFR BLD AUTO: 1 %
BILIRUB SERPL-MCNC: 0.3 MG/DL (ref 0.2–1.1)
BUN BLD-MCNC: 16 MG/DL (ref 9–23)
CALCIUM BLD-MCNC: 9.5 MG/DL (ref 8.7–10.6)
CHLORIDE SERPL-SCNC: 100 MMOL/L (ref 98–112)
CHOLEST SERPL-MCNC: 215 MG/DL (ref ?–200)
CO2 SERPL-SCNC: 22 MMOL/L (ref 21–32)
CREAT BLD-MCNC: 0.92 MG/DL (ref 0.55–1.02)
CRP SERPL-MCNC: <0.5 MG/DL (ref ?–0.5)
EGFRCR SERPLBLD CKD-EPI 2021: 68 ML/MIN/1.73M2 (ref 60–?)
EOSINOPHIL # BLD AUTO: 0.12 X10(3) UL (ref 0–0.7)
EOSINOPHIL NFR BLD AUTO: 2 %
ERYTHROCYTE [DISTWIDTH] IN BLOOD BY AUTOMATED COUNT: 12.9 %
ERYTHROCYTE [SEDIMENTATION RATE] IN BLOOD: 11 MM/HR (ref 0–30)
FASTING PATIENT LIPID ANSWER: YES
FASTING STATUS PATIENT QL REPORTED: YES
GLOBULIN PLAS-MCNC: 3 G/DL (ref 2–3.5)
GLUCOSE BLD-MCNC: 99 MG/DL (ref 70–99)
HCT VFR BLD AUTO: 41.3 % (ref 35–48)
HDLC SERPL-MCNC: 54 MG/DL (ref 40–59)
HGB BLD-MCNC: 13.7 G/DL (ref 12–16)
IMM GRANULOCYTES # BLD AUTO: 0.01 X10(3) UL (ref 0–1)
IMM GRANULOCYTES NFR BLD: 0.2 %
LDLC SERPL CALC-MCNC: 132 MG/DL (ref ?–100)
LYMPHOCYTES # BLD AUTO: 2.66 X10(3) UL (ref 1–4)
LYMPHOCYTES NFR BLD AUTO: 45 %
MCH RBC QN AUTO: 30.6 PG (ref 26–34)
MCHC RBC AUTO-ENTMCNC: 33.2 G/DL (ref 31–37)
MCV RBC AUTO: 92.2 FL (ref 80–100)
MONOCYTES # BLD AUTO: 0.53 X10(3) UL (ref 0.1–1)
MONOCYTES NFR BLD AUTO: 9 %
NEUTROPHILS # BLD AUTO: 2.53 X10 (3) UL (ref 1.5–7.7)
NEUTROPHILS # BLD AUTO: 2.53 X10(3) UL (ref 1.5–7.7)
NEUTROPHILS NFR BLD AUTO: 42.8 %
NONHDLC SERPL-MCNC: 161 MG/DL (ref ?–130)
OSMOLALITY SERPL CALC.SUM OF ELEC: 287 MOSM/KG (ref 275–295)
PLATELET # BLD AUTO: 256 10(3)UL (ref 150–450)
POTASSIUM SERPL-SCNC: 4.1 MMOL/L (ref 3.5–5.1)
PROT SERPL-MCNC: 7.5 G/DL (ref 5.7–8.2)
RBC # BLD AUTO: 4.48 X10(6)UL (ref 3.8–5.3)
SODIUM SERPL-SCNC: 138 MMOL/L (ref 136–145)
T4 FREE SERPL-MCNC: 1.6 NG/DL (ref 0.8–1.7)
TRIGL SERPL-MCNC: 166 MG/DL (ref 30–149)
TSI SER-ACNC: 1.79 UIU/ML (ref 0.55–4.78)
VLDLC SERPL CALC-MCNC: 30 MG/DL (ref 0–30)
WBC # BLD AUTO: 5.9 X10(3) UL (ref 4–11)

## 2025-08-12 PROCEDURE — 85025 COMPLETE CBC W/AUTO DIFF WBC: CPT

## 2025-08-12 PROCEDURE — 86140 C-REACTIVE PROTEIN: CPT

## 2025-08-12 PROCEDURE — 36415 COLL VENOUS BLD VENIPUNCTURE: CPT

## 2025-08-12 PROCEDURE — 80061 LIPID PANEL: CPT

## 2025-08-12 PROCEDURE — 84439 ASSAY OF FREE THYROXINE: CPT

## 2025-08-12 PROCEDURE — 80053 COMPREHEN METABOLIC PANEL: CPT

## 2025-08-12 PROCEDURE — 84443 ASSAY THYROID STIM HORMONE: CPT

## 2025-08-12 PROCEDURE — 85652 RBC SED RATE AUTOMATED: CPT

## 2025-08-26 ENCOUNTER — TELEPHONE (OUTPATIENT)
Dept: FAMILY MEDICINE CLINIC | Facility: CLINIC | Age: 65
End: 2025-08-26

## (undated) NOTE — LETTER
Tiffany Hensley   [id number] [facility] [id number]           Dear Tiffany Hensley     Our records indicate that you have outstanding lab work and or testing that was ordered for you and has not yet been completed:  Lab Frequency Next Occurrence   Lipid Panel [E] Once 07/08/2024      To provide you with the best possible care, please complete these orders at your earliest convenience. If you have recently completed these orders please disregard this letter.     If you have any questions please call the office at [phone].     Thank you,     [facility]